# Patient Record
Sex: MALE | Race: WHITE | Employment: PART TIME | URBAN - METROPOLITAN AREA
[De-identification: names, ages, dates, MRNs, and addresses within clinical notes are randomized per-mention and may not be internally consistent; named-entity substitution may affect disease eponyms.]

---

## 2019-12-17 ENCOUNTER — ANESTHESIA EVENT (OUTPATIENT)
Dept: SURGERY | Age: 59
End: 2019-12-17
Payer: COMMERCIAL

## 2019-12-18 ENCOUNTER — ANESTHESIA (OUTPATIENT)
Dept: SURGERY | Age: 59
End: 2019-12-18
Payer: COMMERCIAL

## 2019-12-18 ENCOUNTER — APPOINTMENT (OUTPATIENT)
Dept: GENERAL RADIOLOGY | Age: 59
End: 2019-12-18
Attending: ORTHOPAEDIC SURGERY
Payer: COMMERCIAL

## 2019-12-18 ENCOUNTER — HOSPITAL ENCOUNTER (OUTPATIENT)
Age: 59
Setting detail: OUTPATIENT SURGERY
Discharge: HOME | End: 2019-12-18
Attending: ORTHOPAEDIC SURGERY | Admitting: ORTHOPAEDIC SURGERY
Payer: COMMERCIAL

## 2019-12-18 VITALS
HEART RATE: 78 BPM | TEMPERATURE: 97.4 F | SYSTOLIC BLOOD PRESSURE: 168 MMHG | HEIGHT: 76 IN | RESPIRATION RATE: 15 BRPM | DIASTOLIC BLOOD PRESSURE: 91 MMHG | OXYGEN SATURATION: 96 % | BODY MASS INDEX: 29.22 KG/M2 | WEIGHT: 240 LBS

## 2019-12-18 LAB
GLUCOSE BLD STRIP.AUTO-MCNC: 111 MG/DL (ref 65–100)
POTASSIUM BLD-SCNC: 3.5 MMOL/L (ref 3.5–5.1)

## 2019-12-18 PROCEDURE — 76942 ECHO GUIDE FOR BIOPSY: CPT | Performed by: ORTHOPAEDIC SURGERY

## 2019-12-18 PROCEDURE — 76060000033 HC ANESTHESIA 1 TO 1.5 HR: Performed by: ORTHOPAEDIC SURGERY

## 2019-12-18 PROCEDURE — 76210000020 HC REC RM PH II FIRST 0.5 HR: Performed by: ORTHOPAEDIC SURGERY

## 2019-12-18 PROCEDURE — C1713 ANCHOR/SCREW BN/BN,TIS/BN: HCPCS | Performed by: ORTHOPAEDIC SURGERY

## 2019-12-18 PROCEDURE — 77030000032 HC CUF TRNQT ZIMM -B: Performed by: ORTHOPAEDIC SURGERY

## 2019-12-18 PROCEDURE — 82962 GLUCOSE BLOOD TEST: CPT

## 2019-12-18 PROCEDURE — 77030003602 HC NDL NRV BLK BBMI -B: Performed by: ANESTHESIOLOGY

## 2019-12-18 PROCEDURE — 76210000063 HC OR PH I REC FIRST 0.5 HR: Performed by: ORTHOPAEDIC SURGERY

## 2019-12-18 PROCEDURE — 84132 ASSAY OF SERUM POTASSIUM: CPT

## 2019-12-18 PROCEDURE — 74011000250 HC RX REV CODE- 250: Performed by: NURSE ANESTHETIST, CERTIFIED REGISTERED

## 2019-12-18 PROCEDURE — 74011250636 HC RX REV CODE- 250/636: Performed by: NURSE ANESTHETIST, CERTIFIED REGISTERED

## 2019-12-18 PROCEDURE — 74011250636 HC RX REV CODE- 250/636: Performed by: ANESTHESIOLOGY

## 2019-12-18 PROCEDURE — 77030008848 HC WRE K SYNT -B: Performed by: ORTHOPAEDIC SURGERY

## 2019-12-18 PROCEDURE — 76010010054 HC POST OP PAIN BLOCK: Performed by: ORTHOPAEDIC SURGERY

## 2019-12-18 PROCEDURE — 77030006788 HC BLD SAW OSC STRY -B: Performed by: ORTHOPAEDIC SURGERY

## 2019-12-18 PROCEDURE — 74011250636 HC RX REV CODE- 250/636: Performed by: ORTHOPAEDIC SURGERY

## 2019-12-18 PROCEDURE — 76010000161 HC OR TIME 1 TO 1.5 HR INTENSV-TIER 1: Performed by: ORTHOPAEDIC SURGERY

## 2019-12-18 PROCEDURE — 77030002933 HC SUT MCRYL J&J -A: Performed by: ORTHOPAEDIC SURGERY

## 2019-12-18 PROCEDURE — 77030002916 HC SUT ETHLN J&J -A: Performed by: ORTHOPAEDIC SURGERY

## 2019-12-18 PROCEDURE — 77030018836 HC SOL IRR NACL ICUM -A: Performed by: ORTHOPAEDIC SURGERY

## 2019-12-18 PROCEDURE — 77030020274 HC MISC IMPL ORTHOPEDIC: Performed by: ORTHOPAEDIC SURGERY

## 2019-12-18 PROCEDURE — 77030025281 HC SPLNT ORTHGLS 1 BSNM -B: Performed by: ORTHOPAEDIC SURGERY

## 2019-12-18 PROCEDURE — 77030022715 HC BIT DRL WRGH -D: Performed by: ORTHOPAEDIC SURGERY

## 2019-12-18 DEVICE — IMPLANTABLE DEVICE: Type: IMPLANTABLE DEVICE | Site: ANKLE | Status: FUNCTIONAL

## 2019-12-18 DEVICE — K WIRE FIX L150MM DIA1MM S STL TRCR PNT BOTH END: Type: IMPLANTABLE DEVICE | Site: ANKLE | Status: FUNCTIONAL

## 2019-12-18 RX ORDER — MIDAZOLAM HYDROCHLORIDE 1 MG/ML
2 INJECTION, SOLUTION INTRAMUSCULAR; INTRAVENOUS
Status: DISCONTINUED | OUTPATIENT
Start: 2019-12-18 | End: 2019-12-18 | Stop reason: HOSPADM

## 2019-12-18 RX ORDER — SODIUM CHLORIDE, SODIUM LACTATE, POTASSIUM CHLORIDE, CALCIUM CHLORIDE 600; 310; 30; 20 MG/100ML; MG/100ML; MG/100ML; MG/100ML
50 INJECTION, SOLUTION INTRAVENOUS CONTINUOUS
Status: DISCONTINUED | OUTPATIENT
Start: 2019-12-18 | End: 2019-12-18 | Stop reason: HOSPADM

## 2019-12-18 RX ORDER — ALBUTEROL SULFATE 0.83 MG/ML
2.5 SOLUTION RESPIRATORY (INHALATION) AS NEEDED
Status: DISCONTINUED | OUTPATIENT
Start: 2019-12-18 | End: 2019-12-18 | Stop reason: HOSPADM

## 2019-12-18 RX ORDER — LIDOCAINE HYDROCHLORIDE 20 MG/ML
INJECTION, SOLUTION EPIDURAL; INFILTRATION; INTRACAUDAL; PERINEURAL AS NEEDED
Status: DISCONTINUED | OUTPATIENT
Start: 2019-12-18 | End: 2019-12-18 | Stop reason: HOSPADM

## 2019-12-18 RX ORDER — SODIUM CHLORIDE 0.9 % (FLUSH) 0.9 %
5-40 SYRINGE (ML) INJECTION EVERY 8 HOURS
Status: DISCONTINUED | OUTPATIENT
Start: 2019-12-18 | End: 2019-12-18 | Stop reason: HOSPADM

## 2019-12-18 RX ORDER — MIDAZOLAM HYDROCHLORIDE 1 MG/ML
2 INJECTION, SOLUTION INTRAMUSCULAR; INTRAVENOUS ONCE
Status: COMPLETED | OUTPATIENT
Start: 2019-12-18 | End: 2019-12-18

## 2019-12-18 RX ORDER — PROPOFOL 10 MG/ML
INJECTION, EMULSION INTRAVENOUS AS NEEDED
Status: DISCONTINUED | OUTPATIENT
Start: 2019-12-18 | End: 2019-12-18 | Stop reason: HOSPADM

## 2019-12-18 RX ORDER — SODIUM CHLORIDE, SODIUM LACTATE, POTASSIUM CHLORIDE, CALCIUM CHLORIDE 600; 310; 30; 20 MG/100ML; MG/100ML; MG/100ML; MG/100ML
75 INJECTION, SOLUTION INTRAVENOUS CONTINUOUS
Status: DISCONTINUED | OUTPATIENT
Start: 2019-12-18 | End: 2019-12-18 | Stop reason: HOSPADM

## 2019-12-18 RX ORDER — FENTANYL CITRATE 50 UG/ML
100 INJECTION, SOLUTION INTRAMUSCULAR; INTRAVENOUS ONCE
Status: COMPLETED | OUTPATIENT
Start: 2019-12-18 | End: 2019-12-18

## 2019-12-18 RX ORDER — CELECOXIB 200 MG/1
200 CAPSULE ORAL
Status: DISCONTINUED | OUTPATIENT
Start: 2019-12-18 | End: 2019-12-18 | Stop reason: HOSPADM

## 2019-12-18 RX ORDER — LIDOCAINE HYDROCHLORIDE 10 MG/ML
0.1 INJECTION INFILTRATION; PERINEURAL AS NEEDED
Status: DISCONTINUED | OUTPATIENT
Start: 2019-12-18 | End: 2019-12-18 | Stop reason: HOSPADM

## 2019-12-18 RX ORDER — CEFAZOLIN SODIUM/WATER 2 G/20 ML
2 SYRINGE (ML) INTRAVENOUS ONCE
Status: COMPLETED | OUTPATIENT
Start: 2019-12-18 | End: 2019-12-18

## 2019-12-18 RX ORDER — SODIUM CHLORIDE 0.9 % (FLUSH) 0.9 %
5-40 SYRINGE (ML) INJECTION AS NEEDED
Status: DISCONTINUED | OUTPATIENT
Start: 2019-12-18 | End: 2019-12-18 | Stop reason: HOSPADM

## 2019-12-18 RX ORDER — HYDROMORPHONE HYDROCHLORIDE 2 MG/ML
0.5 INJECTION, SOLUTION INTRAMUSCULAR; INTRAVENOUS; SUBCUTANEOUS
Status: DISCONTINUED | OUTPATIENT
Start: 2019-12-18 | End: 2019-12-18 | Stop reason: HOSPADM

## 2019-12-18 RX ORDER — PROPOFOL 10 MG/ML
INJECTION, EMULSION INTRAVENOUS
Status: DISCONTINUED | OUTPATIENT
Start: 2019-12-18 | End: 2019-12-18 | Stop reason: HOSPADM

## 2019-12-18 RX ORDER — OXYCODONE HYDROCHLORIDE 5 MG/1
5 TABLET ORAL
Status: DISCONTINUED | OUTPATIENT
Start: 2019-12-18 | End: 2019-12-18 | Stop reason: HOSPADM

## 2019-12-18 RX ADMIN — MEPIVACAINE HYDROCHLORIDE 5 ML: 20 INJECTION, SOLUTION EPIDURAL; INFILTRATION at 07:15

## 2019-12-18 RX ADMIN — ROPIVACAINE HYDROCHLORIDE 10 ML: 5 INJECTION, SOLUTION EPIDURAL; INFILTRATION; PERINEURAL at 07:15

## 2019-12-18 RX ADMIN — PROPOFOL 50 MG: 10 INJECTION, EMULSION INTRAVENOUS at 07:51

## 2019-12-18 RX ADMIN — MIDAZOLAM 2 MG: 1 INJECTION INTRAMUSCULAR; INTRAVENOUS at 07:10

## 2019-12-18 RX ADMIN — SODIUM CHLORIDE, SODIUM LACTATE, POTASSIUM CHLORIDE, AND CALCIUM CHLORIDE 75 ML/HR: 600; 310; 30; 20 INJECTION, SOLUTION INTRAVENOUS at 07:10

## 2019-12-18 RX ADMIN — FENTANYL CITRATE 50 MCG: 50 INJECTION, SOLUTION INTRAMUSCULAR; INTRAVENOUS at 07:10

## 2019-12-18 RX ADMIN — PROPOFOL 75 MCG/KG/MIN: 10 INJECTION, EMULSION INTRAVENOUS at 07:53

## 2019-12-18 RX ADMIN — MEPIVACAINE HYDROCHLORIDE 5 ML: 20 INJECTION, SOLUTION EPIDURAL; INFILTRATION at 07:13

## 2019-12-18 RX ADMIN — Medication 2 G: at 07:51

## 2019-12-18 RX ADMIN — LIDOCAINE HYDROCHLORIDE 50 MG: 20 INJECTION, SOLUTION EPIDURAL; INFILTRATION; INTRACAUDAL; PERINEURAL at 07:51

## 2019-12-18 RX ADMIN — ROPIVACAINE HYDROCHLORIDE 25 ML: 5 INJECTION, SOLUTION EPIDURAL; INFILTRATION; PERINEURAL at 07:13

## 2019-12-18 NOTE — ANESTHESIA PREPROCEDURE EVALUATION
Relevant Problems   No relevant active problems       Anesthetic History   No history of anesthetic complications            Review of Systems / Medical History  Patient summary reviewed, nursing notes reviewed and pertinent labs reviewed    Pulmonary  Within defined limits                 Neuro/Psych   Within defined limits           Cardiovascular    Hypertension: well controlled              Exercise tolerance: >4 METS  Comments: TTE 2019- Normal EF no significant valve abnl   GI/Hepatic/Renal     GERD: well controlled           Endo/Other    Diabetes: type 2    Arthritis (rheumatoid)     Other Findings            Physical Exam    Airway  Mallampati: II    Neck ROM: normal range of motion   Mouth opening: Normal     Cardiovascular  Regular rate and rhythm,  S1 and S2 normal,  no murmur, click, rub, or gallop             Dental  No notable dental hx       Pulmonary  Breath sounds clear to auscultation               Abdominal         Other Findings            Anesthetic Plan    ASA: 2  Anesthesia type: total IV anesthesia - femoral single shot and popliteal fossa block      Post-op pain plan if not by surgeon: peripheral nerve block single    Induction: Intravenous  Anesthetic plan and risks discussed with: Patient and Spouse

## 2019-12-18 NOTE — ANESTHESIA POSTPROCEDURE EVALUATION
Procedure(s):  RIGHT MIDFOOT ACHILLES LENGTHENING / CALCANEAL OSTEOTOMY / SECOUND PROXIMAL INTERPHALANGEAL RESECTION ARTHROPLASTY. total IV anesthesia    Anesthesia Post Evaluation      Multimodal analgesia: multimodal analgesia used between 6 hours prior to anesthesia start to PACU discharge  Patient location during evaluation: PACU  Patient participation: complete - patient participated  Level of consciousness: awake and alert  Pain score: 0  Pain management: adequate  Airway patency: patent  Anesthetic complications: no  Cardiovascular status: acceptable  Respiratory status: acceptable, spontaneous ventilation and nonlabored ventilation  Hydration status: acceptable  Post anesthesia nausea and vomiting:  none      Vitals Value Taken Time   /100 12/18/2019  9:22 AM   Temp 36.3 °C (97.4 °F) 12/18/2019  9:16 AM   Pulse 67 12/18/2019  9:25 AM   Resp 15 12/18/2019  9:22 AM   SpO2 96 % 12/18/2019  9:25 AM   Vitals shown include unvalidated device data.

## 2019-12-18 NOTE — PERIOP NOTES
PACU DISCHARGE NOTE  Vital signs stable, pain well controlled, alert and oriented times three or at baseline, no anesthetic complications. IV removed with catheter tip intact. Written and verbal discharge instructions given, including pain control, dressing care and follow up appointment. Spouse, Sandro Blair verbalized understanding and signed discharge instructions electronically. All questions answered prior to discharge. Dr Shaw Book okay to discharge at this time. Pt and all belongings taken via wheelchair and safely put in vehicle.

## 2019-12-18 NOTE — DISCHARGE INSTRUCTIONS
INSTRUCTIONS FOLLOWING FOOT SURGERY    ACTIVITY  Elevate foot. No Ice    No weight bearing. Use crutches or knee walker until seen in follow up appointment  Don't put anything into the splint to relieve itching etc. Take one 325mg aspirin daily if okay with your medical doctor and you have no GI ulcer. Get up and out of bed frequently. While in bed move the legs as much as possible)      DRESSING CARE Keep dry and in place until follow up appointment      DIET  Day of Surgery: Clear liquids until no nausea or vomiting; then light, bland diet (Baked chicken, plain rice, grits, scrambled egg, toast). Nothing Greasy, fried or spicy today  Advance to regular diet on second day, unless your doctor orders otherwise. PAIN  Take pain medications as directed by your doctor. Call your doctor if pain is NOT relieved by medication. PAIN MED SIDE EFFECTS  Constipation: Lots of fluids, try prune juice, then OTC stool softeners if necessary  Nausea: Take medication with food. CALL YOUR DOCTOR IF YOU HAVE  Excessive bleeding that does not stop after holding mild pressure over the area. Temperature of 101 degrees or above. Redness, excessive swelling or bruising, and/or green or yellow, smelly discharge from incision. Loss of sensation - cold, white or blue toes. AFTER ANESTHESIA  For the first 24 hours and while taking narcotics for pain: DO NOT Drive, Drink Alcoholic beverages, or make important Decisions. Be aware of dizziness following anesthesia and while taking pain medication. Preventing Infection at Home  We care about preventing infection and avoiding the spread of germs - not only when you are in the hospital but also when you return home. When you return home from the hospital, its important to take the following steps to help prevent infection and avoid spreading germs that could infect you and others. Ask everyone in your home to follow these guidelines, too.     Clean Your Hands  · Clean your hands whenever your hands are visibly dirty, before you eat, before or after touching your mouth, nose or eyes, and before preparing food. Clean them after contact with body fluids, using the restroom, touching animals or changing diapers. · When washing hands, wet them with warm water and work up a lather. Rub hands for at least 15 seconds, then rinse them and pat them dry with a clean towel or paper towel. · When using hand sanitizers, it should take about 15 seconds to rub your hands dry. If not, you probably didnt apply enough . Cover Your Sneeze or Cough  Germs are released into the air whenever you sneeze or cough. To prevent the spread of infection:  · Turn away from other people before coughing or sneezing. · Cover your mouth or nose with a tissue when you cough or sneeze. Put the tissue in the trash. · If you dont have a tissue, cough or sneeze into your upper sleeve, not your hands. · Always clean your hands after coughing or sneezing. Care for Wounds  Your skin is your bodys first line of defense against germs, but an open wound leaves an easy way for germs to enter your body. To prevent infection:  · Clean your hands before and after changing wound dressings, and wear gloves to change dressings if recommended by your doctor. · Take special care with IV lines or other devices inserted into the body. If you must touch them, clean your hands first.  · Follow any specific instructions from your doctor to care for your wounds. Contact your doctor if you experience any signs of infection, such as fever or increased redness at the surgical or wound site. Keep a Clean Home  · Clean or wipe commonly touched hard surfaces like door handles, sinks, tabletops, phones and TV remotes. · Use products labeled disinfectant to kill harmful bacteria and viruses. · Use a clean cloth or paper towel to clean and dry surfaces.  Wiping surfaces with a dirty dishcloth, sponge or towel will only spread germs. · Never share toothbrushes, mccray, drinking glasses, utensils, razor blades, face cloths or bath towels to avoid spreading germs. · Be sure that the linens that you sleep on are clean. · Keep pets away from wounds and wash your hands after touching pets, their toys or bedding. We care about you and your health. Remember, preventing infections is a team effort between you, your family, friends and health care providers. DISCHARGE SUMMARY from Nurse    PATIENT INSTRUCTIONS:    After general anesthesia or intravenous sedation, for 24 hours or while taking prescription Narcotics:  · Limit your activities  · Do not drive and operate hazardous machinery  · Do not make important personal or business decisions  · Do  not drink alcoholic beverages  · If you have not urinated within 8 hours after discharge, please contact your surgeon on call. *  Please give a list of your current medications to your Primary Care Provider. *  Please update this list whenever your medications are discontinued, doses are      changed, or new medications (including over-the-counter products) are added. *  Please carry medication information at all times in case of emergency situations. These are general instructions for a healthy lifestyle:    No smoking/ No tobacco products/ Avoid exposure to second hand smoke    Surgeon General's Warning:  Quitting smoking now greatly reduces serious risk to your health. Obesity, smoking, and sedentary lifestyle greatly increases your risk for illness    A healthy diet, regular physical exercise & weight monitoring are important for maintaining a healthy lifestyle    You may be retaining fluid if you have a history of heart failure or if you experience any of the following symptoms:  Weight gain of 3 pounds or more overnight or 5 pounds in a week, increased swelling in our hands or feet or shortness of breath while lying flat in bed.   Please call your doctor as soon as you notice any of these symptoms; do not wait until your next office visit. Recognize signs and symptoms of STROKE:    F-face looks uneven    A-arms unable to move or move unevenly    S-speech slurred or non-existent    T-time-call 911 as soon as signs and symptoms begin-DO NOT go       Back to bed or wait to see if you get better-TIME IS BRAIN.

## 2019-12-18 NOTE — ANESTHESIA PROCEDURE NOTES
Peripheral Block    Start time: 12/18/2019 7:13 AM  End time: 12/18/2019 7:15 AM  Performed by: Parmjit Link MD  Authorized by: Parmjit Link MD       Pre-procedure: Indications: at surgeon's request and post-op pain management    Preanesthetic Checklist: patient identified, risks and benefits discussed, site marked, timeout performed, anesthesia consent given and patient being monitored    Timeout Time: 07:13          Block Type:   Block Type:   Adductor canal  Laterality:  Right  Monitoring:  Responsive to questions, continuous pulse ox, oxygen, frequent vital sign checks and heart rate  Injection Technique:  Single shot  Procedures: ultrasound guided    Patient Position: supine  Prep: chlorhexidine    Location:  Upper thigh  Needle Type:  Stimuplex  Needle Gauge:  21 G  Needle Localization:  Ultrasound guidance    Assessment:  Number of attempts:  1  Injection Assessment:  Incremental injection every 5 mL, negative aspiration for CSF, no paresthesia, ultrasound image on chart, no intravascular symptoms, negative aspiration for blood and local visualized surrounding nerve on ultrasound  Patient tolerance:  Patient tolerated the procedure well with no immediate complications

## 2019-12-18 NOTE — ANESTHESIA PROCEDURE NOTES
Peripheral Block    Start time: 12/18/2019 7:10 AM  End time: 12/18/2019 7:13 AM  Performed by: Jose De Jesus Contreras MD  Authorized by: Jose De Jesus Contreras MD       Pre-procedure:    Indications: at surgeon's request and post-op pain management    Preanesthetic Checklist: patient identified, risks and benefits discussed, site marked, timeout performed, anesthesia consent given and patient being monitored    Timeout Time: 07:10          Block Type:   Block Type:  Popliteal  Laterality:  Right  Monitoring:  Responsive to questions, continuous pulse ox, oxygen, frequent vital sign checks and heart rate  Injection Technique:  Single shot  Procedures: ultrasound guided and nerve stimulator    Prep: chlorhexidine    Location:  Lower thigh  Needle Type:  Stimuplex  Needle Gauge:  21 G  Needle Localization:  Ultrasound guidance and nerve stimulator  Motor Response: minimal motor response >0.4 mA      Assessment:  Number of attempts:  1  Injection Assessment:  Incremental injection every 5 mL, negative aspiration for CSF, no paresthesia, ultrasound image on chart, no intravascular symptoms, negative aspiration for blood and local visualized surrounding nerve on ultrasound  Patient tolerance:  Patient tolerated the procedure well with no immediate complications

## 2019-12-19 NOTE — OP NOTES
[facility]  OPERATIVE REPORT    Name:  Karie Warner  MR#:  [id number]  :  1960  ACCOUNT #:  [de-identified]  DATE OF SERVICE:  2019    PREOPERATIVE DIAGNOSES:  1. 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 2.  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 POSTOPERATIVE DIAGNOSES:  1. 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 2.  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 PROCEDURES PERFORMED:  1. Right triple hemisection Achilles lengthening, 34505.  2.  Right medial displacement calcaneal osteotomy, 99235.  3.  Right midfoot arthrodesis with plantar flexing osteotomy and multiple joint arthrodesis, 74160.  4.  Right second DIP resection arthroplasty, 06283. SURGEON:  Ginger Tamez MD        ANESTHESIA:  Popliteal block with monitored anesthesia. ESTIMATED BLOOD LOSS:  Minimal.    TOURNIQUET TIME:  63 minutes at 250 mmHg. ANTIBIOTIC PROPHYLAXIS:  Ancef given prior to the procedure. .    INDICATION:  The patient is a 55-year-old white male with a significant planovalgus foot deformity and collapse of the arch consistent with probable Charcot arthropathy with a history of significant neuropathy as well as an Achilles contracture and a malunion of the second DIP joint who presents today for operative treatment. The risks and benefits of the procedure including, but not limited, to the risks of anesthetic complications, myocardial infarction, stroke, death, and surgical complications including damage to blood vessels, risks of infection, incomplete pain relief, risk of malunion, risk of nonunion, risk for recurrence, need for lifetime bracing and need for additional surgery were discussed with the patient. He understands the risks and wishes to proceed with surgery at this time.     DETAILS OF THE PROCEDURE:  The patient's operative site was marked with indelible ink in the preoperative holding area. A block was placed by the Department of Anesthesia. He was brought to the operating room, placed supine, and prepped with surgical timeout. The right lower extremity was identified as surgical site, prepped and draped in the standard sterile fashion with ChloraPrep solution. A triple hemisection Achilles lengthening was then performed through three small stab incisions in the Achilles with good correction of the patient's equinus deformity. Lateral approach to the heel was then performed at time. A calcaneal osteotomy was performed with the tuberosity fragment shifted approximately 1 cm medially and secured using a [facility] titanium headless screw under fluoroscopic guidance confirming to be adequately placed. It was irrigated and closed using Monocryl and nylon sutures. A medial approach to the midfoot was then performed at time. The anterior tibial tendon was then transected and later repaired to prepare the first tarsometatarsal joint. A sagittal saw was used to create a plantar flexion closing osteotomy of the joint. The navicular cuneiform joint was also prepared using a curette followed by osteotome. The arch was then brought to desired clinical alignment and a separate arthrotomy was made of the great toe where a guide pin was then placed for the [facility] bolt. After confirming adequate placement, the bolt was placed over the guidewire with good purchase noted and confirmed to be adequately placed on image intensification. The wound was then irrigated and closed using Monocryl and nylon sutures.   DIP resection arthroplasty of the second toe was then performed through an elliptical incision and the arthrodesis site was then freshened using a saw and a [employer] 2.4 mm headless screw was then placed across the DIP joint and confirmed to be adequately placed on image intensification. The wounds were irrigated and closed using Monocryl and nylon sutures. Sterile dressings then applied followed by a well-padded posterior splint. Anesthesia was discontinued. The patient was transferred to recovery bed and taken to recovery room in satisfactory condition. He appeared to tolerate the procedure well. There were no apparent general anesthetic complications. All needle, instrument, and sponge counts were correct.       Nighat Neri MD      [id number]  D:  12/18/2019 12:26  T:  12/18/2019 23:52  JOB #:  [id number]

## 2020-07-16 ENCOUNTER — ANESTHESIA (OUTPATIENT)
Dept: SURGERY | Age: 60
End: 2020-07-16
Payer: COMMERCIAL

## 2020-07-16 ENCOUNTER — APPOINTMENT (OUTPATIENT)
Dept: GENERAL RADIOLOGY | Age: 60
End: 2020-07-16
Attending: ORTHOPAEDIC SURGERY
Payer: COMMERCIAL

## 2020-07-16 ENCOUNTER — HOSPITAL ENCOUNTER (OUTPATIENT)
Age: 60
Setting detail: OUTPATIENT SURGERY
Discharge: HOME | End: 2020-07-16
Attending: ORTHOPAEDIC SURGERY | Admitting: ORTHOPAEDIC SURGERY
Payer: COMMERCIAL

## 2020-07-16 ENCOUNTER — ANESTHESIA EVENT (OUTPATIENT)
Dept: SURGERY | Age: 60
End: 2020-07-16
Payer: COMMERCIAL

## 2020-07-16 VITALS
OXYGEN SATURATION: 98 % | DIASTOLIC BLOOD PRESSURE: 67 MMHG | HEART RATE: 66 BPM | RESPIRATION RATE: 16 BRPM | BODY MASS INDEX: 30.8 KG/M2 | TEMPERATURE: 97.8 F | WEIGHT: 253 LBS | SYSTOLIC BLOOD PRESSURE: 136 MMHG

## 2020-07-16 LAB
GLUCOSE BLD STRIP.AUTO-MCNC: 101 MG/DL (ref 65–100)
POTASSIUM BLD-SCNC: 3.8 MMOL/L (ref 3.5–5.1)

## 2020-07-16 PROCEDURE — 77030018836 HC SOL IRR NACL ICUM -A: Performed by: ORTHOPAEDIC SURGERY

## 2020-07-16 PROCEDURE — 77030003044 HC WRE K WRGH -B: Performed by: ORTHOPAEDIC SURGERY

## 2020-07-16 PROCEDURE — 74011250636 HC RX REV CODE- 250/636: Performed by: ORTHOPAEDIC SURGERY

## 2020-07-16 PROCEDURE — 77030040572 HC DRVR HEX PROSTEP MICA WRGH -C: Performed by: ORTHOPAEDIC SURGERY

## 2020-07-16 PROCEDURE — 77030010509 HC AIRWY LMA MSK TELE -A: Performed by: ANESTHESIOLOGY

## 2020-07-16 PROCEDURE — 77030000032 HC CUF TRNQT ZIMM -B: Performed by: ORTHOPAEDIC SURGERY

## 2020-07-16 PROCEDURE — 77030002933 HC SUT MCRYL J&J -A: Performed by: ORTHOPAEDIC SURGERY

## 2020-07-16 PROCEDURE — 76010000160 HC OR TIME 0.5 TO 1 HR INTENSV-TIER 1: Performed by: ORTHOPAEDIC SURGERY

## 2020-07-16 PROCEDURE — 77030019908 HC STETH ESOPH SIMS -A: Performed by: ANESTHESIOLOGY

## 2020-07-16 PROCEDURE — 77030006788 HC BLD SAW OSC STRY -B: Performed by: ORTHOPAEDIC SURGERY

## 2020-07-16 PROCEDURE — 82962 GLUCOSE BLOOD TEST: CPT

## 2020-07-16 PROCEDURE — 77030002916 HC SUT ETHLN J&J -A: Performed by: ORTHOPAEDIC SURGERY

## 2020-07-16 PROCEDURE — 74011000250 HC RX REV CODE- 250: Performed by: STUDENT IN AN ORGANIZED HEALTH CARE EDUCATION/TRAINING PROGRAM

## 2020-07-16 PROCEDURE — 74011250636 HC RX REV CODE- 250/636: Performed by: STUDENT IN AN ORGANIZED HEALTH CARE EDUCATION/TRAINING PROGRAM

## 2020-07-16 PROCEDURE — 77030020274 HC MISC IMPL ORTHOPEDIC: Performed by: ORTHOPAEDIC SURGERY

## 2020-07-16 PROCEDURE — 76060000033 HC ANESTHESIA 1 TO 1.5 HR: Performed by: ORTHOPAEDIC SURGERY

## 2020-07-16 PROCEDURE — C1713 ANCHOR/SCREW BN/BN,TIS/BN: HCPCS | Performed by: ORTHOPAEDIC SURGERY

## 2020-07-16 PROCEDURE — 74011000250 HC RX REV CODE- 250: Performed by: ORTHOPAEDIC SURGERY

## 2020-07-16 PROCEDURE — 77030040571: Performed by: ORTHOPAEDIC SURGERY

## 2020-07-16 PROCEDURE — 76210000021 HC REC RM PH II 0.5 TO 1 HR: Performed by: ORTHOPAEDIC SURGERY

## 2020-07-16 PROCEDURE — 77030020275 HC MISC ORTHOPEDIC: Performed by: ORTHOPAEDIC SURGERY

## 2020-07-16 PROCEDURE — 76210000063 HC OR PH I REC FIRST 0.5 HR: Performed by: ORTHOPAEDIC SURGERY

## 2020-07-16 PROCEDURE — 84132 ASSAY OF SERUM POTASSIUM: CPT

## 2020-07-16 PROCEDURE — 74011250636 HC RX REV CODE- 250/636: Performed by: ANESTHESIOLOGY

## 2020-07-16 PROCEDURE — 77030040573 HC BUR PROSTEP MICA WRGH -C: Performed by: ORTHOPAEDIC SURGERY

## 2020-07-16 PROCEDURE — 77030020778 HC CAP PROTCT PIN JRGN -A: Performed by: ORTHOPAEDIC SURGERY

## 2020-07-16 DEVICE — SCREW
Type: IMPLANTABLE DEVICE | Site: FOOT | Status: FUNCTIONAL
Brand: HV SCREW SYSTEM

## 2020-07-16 RX ORDER — LIDOCAINE HYDROCHLORIDE 20 MG/ML
INJECTION, SOLUTION EPIDURAL; INFILTRATION; INTRACAUDAL; PERINEURAL AS NEEDED
Status: DISCONTINUED | OUTPATIENT
Start: 2020-07-16 | End: 2020-07-16 | Stop reason: HOSPADM

## 2020-07-16 RX ORDER — HYDROMORPHONE HYDROCHLORIDE 2 MG/ML
0.5 INJECTION, SOLUTION INTRAMUSCULAR; INTRAVENOUS; SUBCUTANEOUS
Status: DISCONTINUED | OUTPATIENT
Start: 2020-07-16 | End: 2020-07-16 | Stop reason: HOSPADM

## 2020-07-16 RX ORDER — BUPIVACAINE HYDROCHLORIDE 5 MG/ML
INJECTION, SOLUTION EPIDURAL; INTRACAUDAL AS NEEDED
Status: DISCONTINUED | OUTPATIENT
Start: 2020-07-16 | End: 2020-07-16 | Stop reason: HOSPADM

## 2020-07-16 RX ORDER — SODIUM CHLORIDE, SODIUM LACTATE, POTASSIUM CHLORIDE, CALCIUM CHLORIDE 600; 310; 30; 20 MG/100ML; MG/100ML; MG/100ML; MG/100ML
75 INJECTION, SOLUTION INTRAVENOUS CONTINUOUS
Status: DISCONTINUED | OUTPATIENT
Start: 2020-07-16 | End: 2020-07-16 | Stop reason: HOSPADM

## 2020-07-16 RX ORDER — SODIUM CHLORIDE 0.9 % (FLUSH) 0.9 %
5-40 SYRINGE (ML) INJECTION AS NEEDED
Status: DISCONTINUED | OUTPATIENT
Start: 2020-07-16 | End: 2020-07-16 | Stop reason: HOSPADM

## 2020-07-16 RX ORDER — MIDAZOLAM HYDROCHLORIDE 1 MG/ML
2 INJECTION, SOLUTION INTRAMUSCULAR; INTRAVENOUS
Status: COMPLETED | OUTPATIENT
Start: 2020-07-16 | End: 2020-07-16

## 2020-07-16 RX ORDER — ONDANSETRON 2 MG/ML
INJECTION INTRAMUSCULAR; INTRAVENOUS AS NEEDED
Status: DISCONTINUED | OUTPATIENT
Start: 2020-07-16 | End: 2020-07-16 | Stop reason: HOSPADM

## 2020-07-16 RX ORDER — PROPOFOL 10 MG/ML
INJECTION, EMULSION INTRAVENOUS AS NEEDED
Status: DISCONTINUED | OUTPATIENT
Start: 2020-07-16 | End: 2020-07-16 | Stop reason: HOSPADM

## 2020-07-16 RX ORDER — OXYCODONE AND ACETAMINOPHEN 5; 325 MG/1; MG/1
1 TABLET ORAL AS NEEDED
Status: DISCONTINUED | OUTPATIENT
Start: 2020-07-16 | End: 2020-07-16 | Stop reason: HOSPADM

## 2020-07-16 RX ORDER — SODIUM CHLORIDE 0.9 % (FLUSH) 0.9 %
5-40 SYRINGE (ML) INJECTION EVERY 8 HOURS
Status: DISCONTINUED | OUTPATIENT
Start: 2020-07-16 | End: 2020-07-16 | Stop reason: HOSPADM

## 2020-07-16 RX ORDER — SODIUM CHLORIDE, SODIUM LACTATE, POTASSIUM CHLORIDE, CALCIUM CHLORIDE 600; 310; 30; 20 MG/100ML; MG/100ML; MG/100ML; MG/100ML
INJECTION, SOLUTION INTRAVENOUS
Status: DISCONTINUED | OUTPATIENT
Start: 2020-07-16 | End: 2020-07-16 | Stop reason: HOSPADM

## 2020-07-16 RX ORDER — LIDOCAINE HYDROCHLORIDE 10 MG/ML
0.1 INJECTION INFILTRATION; PERINEURAL AS NEEDED
Status: DISCONTINUED | OUTPATIENT
Start: 2020-07-16 | End: 2020-07-16 | Stop reason: HOSPADM

## 2020-07-16 RX ORDER — EPHEDRINE SULFATE/0.9% NACL/PF 50 MG/5 ML
SYRINGE (ML) INTRAVENOUS AS NEEDED
Status: DISCONTINUED | OUTPATIENT
Start: 2020-07-16 | End: 2020-07-16 | Stop reason: HOSPADM

## 2020-07-16 RX ORDER — NALOXONE HYDROCHLORIDE 0.4 MG/ML
0.2 INJECTION, SOLUTION INTRAMUSCULAR; INTRAVENOUS; SUBCUTANEOUS AS NEEDED
Status: DISCONTINUED | OUTPATIENT
Start: 2020-07-16 | End: 2020-07-16 | Stop reason: HOSPADM

## 2020-07-16 RX ORDER — CEFAZOLIN SODIUM/WATER 2 G/20 ML
2 SYRINGE (ML) INTRAVENOUS ONCE
Status: COMPLETED | OUTPATIENT
Start: 2020-07-16 | End: 2020-07-16

## 2020-07-16 RX ADMIN — Medication 2 G: at 08:53

## 2020-07-16 RX ADMIN — SODIUM CHLORIDE, SODIUM LACTATE, POTASSIUM CHLORIDE, AND CALCIUM CHLORIDE: 600; 310; 30; 20 INJECTION, SOLUTION INTRAVENOUS at 08:42

## 2020-07-16 RX ADMIN — SODIUM CHLORIDE, SODIUM LACTATE, POTASSIUM CHLORIDE, AND CALCIUM CHLORIDE 75 ML/HR: 600; 310; 30; 20 INJECTION, SOLUTION INTRAVENOUS at 08:00

## 2020-07-16 RX ADMIN — Medication 10 MG: at 09:20

## 2020-07-16 RX ADMIN — PROPOFOL 200 MG: 10 INJECTION, EMULSION INTRAVENOUS at 08:48

## 2020-07-16 RX ADMIN — PHENYLEPHRINE HYDROCHLORIDE 150 MCG: 10 INJECTION INTRAVENOUS at 09:06

## 2020-07-16 RX ADMIN — PHENYLEPHRINE HYDROCHLORIDE 100 MCG: 10 INJECTION INTRAVENOUS at 09:20

## 2020-07-16 RX ADMIN — Medication 10 MG: at 09:13

## 2020-07-16 RX ADMIN — LIDOCAINE HYDROCHLORIDE 80 MG: 20 INJECTION, SOLUTION EPIDURAL; INFILTRATION; INTRACAUDAL; PERINEURAL at 08:48

## 2020-07-16 RX ADMIN — PHENYLEPHRINE HYDROCHLORIDE 150 MCG: 10 INJECTION INTRAVENOUS at 09:01

## 2020-07-16 RX ADMIN — PHENYLEPHRINE HYDROCHLORIDE 100 MCG: 10 INJECTION INTRAVENOUS at 08:50

## 2020-07-16 RX ADMIN — SODIUM CHLORIDE, SODIUM LACTATE, POTASSIUM CHLORIDE, AND CALCIUM CHLORIDE: 600; 310; 30; 20 INJECTION, SOLUTION INTRAVENOUS at 09:32

## 2020-07-16 RX ADMIN — Medication 5 MG: at 09:09

## 2020-07-16 RX ADMIN — MIDAZOLAM 2 MG: 1 INJECTION INTRAMUSCULAR; INTRAVENOUS at 08:16

## 2020-07-16 RX ADMIN — ONDANSETRON 4 MG: 2 INJECTION INTRAMUSCULAR; INTRAVENOUS at 09:27

## 2020-07-16 NOTE — BRIEF OP NOTE
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 Procedure(s):  RIGHT MIS AKIN OSTEOTOMY  RIGHT REVISION SECOND PROXIMAL INTERPHALANGEAL RESECTION ARTHROPLASTY AND METATARSAOPHALANGEAL RELEASE/ CHOICE ANES    Surgeon(s):  Arthur Guzman MD    Surgical Assistant: None    Anesthesia: General     Estimated Blood Loss (mL): Minimal    Complications: None    Specimens: * No specimens in log *     Implants:   Implant Name Type Inv.  Item Serial No.  Lot No. LRB No. Used Action   [id number]  3.0 mm cannulated comp hdless screw x 40mm    [employer] [place] [id number] Right 1 Implanted   SCR BNE HALLUX VALGUS 3X26MM --  - [id number]  SCR BNE HALLUX VALGUS 3X26MM --   [employer] [id number] Implanted       Drains: * No LDAs found *    Findings:     Electronically Signed by Farooq Ford MD on 7/16/2020 at 1:57 PM

## 2020-07-16 NOTE — DISCHARGE INSTRUCTIONS
INSTRUCTIONS FOLLOWING FOOT SURGERY    ACTIVITY  Elevate foot   Protected partial weight bearing on the heel only as tolerated in post op shoe after full sensation returns. DIET  Clear liquids until no nausea or vomiting; then light diet for the first day. Advance to regular diet on second day, unless your doctor orders otherwise. PAIN  Take pain medications as directed by your doctor. Call your doctor if pain is NOT relieved by medication. DRESSING CARE Keep dry and in place until follow up appointment    CALL YOUR DOCTOR IF YOU HAVE  Excessive bleeding that does not stop after holding mild pressure over the area. Temperature of 101 degrees or above. Redness, excessive swelling or bruising, and/or green or yellow, smelly discharge from incision. Loss of sensation - cold, white or blue toes. AFTER ANESTHESIA  For the first 24 hours and while taking narcotics for pain: DO NOT Drive, Drink Alcoholic beverages, or make important Decisions. Be aware of dizziness following anesthesia and while taking pain medication. ·         APPOINTMENT DATE/TIME Keep as scheduled    YOUR DOCTOR'S PHONE NUMBER [phone]      DISCHARGE SUMMARY from Nurse    PATIENT INSTRUCTIONS:    After general anesthesia or intravenous sedation, for 24 hours or while taking prescription Narcotics:  · Limit your activities  · Do not drive and operate hazardous machinery  · Do not make important personal or business decisions  · Do  not drink alcoholic beverages  · If you have not urinated within 8 hours after discharge, please contact your surgeon on call. *  Please give a list of your current medications to your Primary Care Provider. *  Please update this list whenever your medications are discontinued, doses are      changed, or new medications (including over-the-counter products) are added. *  Please carry medication information at all times in case of emergency situations.       These are general instructions for a healthy lifestyle:    No smoking/ No tobacco products/ Avoid exposure to second hand smoke    Surgeon General's Warning:  Quitting smoking now greatly reduces serious risk to your health. Obesity, smoking, and sedentary lifestyle greatly increases your risk for illness    A healthy diet, regular physical exercise & weight monitoring are important for maintaining a healthy lifestyle    You may be retaining fluid if you have a history of heart failure or if you experience any of the following symptoms:  Weight gain of 3 pounds or more overnight or 5 pounds in a week, increased swelling in our hands or feet or shortness of breath while lying flat in bed. Please call your doctor as soon as you notice any of these symptoms; do not wait until your next office visit. Recognize signs and symptoms of STROKE:    F-face looks uneven    A-arms unable to move or move unevenly    S-speech slurred or non-existent    T-time-call 911 as soon as signs and symptoms begin-DO NOT go   Advance Care Planning  People with COVID-19 may have no symptoms, mild symptoms, such as fever, cough, and shortness of breath or they may have more severe illness, developing severe and fatal pneumonia. As a result, Advance Care Planning with attention to naming a health care decision maker (someone you trust to make healthcare decisions for you if you could not speak for yourself) and sharing other health care preferences is important BEFORE a possible health crisis. Please contact your Primary Care Provider to discuss Advance Care Planning.      Preventing the Spread of Coronavirus Disease 2019 in Homes and Residential Communities  For the most recent information go to [employer]    Prevention steps for People with confirmed or suspected COVID-19 (including persons under investigation) who do not need to be hospitalized  and   People with confirmed COVID-19 who were hospitalized and determined to be medically stable to go home    Your healthcare provider and public health staff will evaluate whether you can be cared for at home. If it is determined that you do not need to be hospitalized and can be isolated at home, you will be monitored by staff from your local or state health department. You should follow the prevention steps below until a healthcare provider or local or state health department says you can return to your normal activities. Stay home except to get medical care  People who are mildly ill with COVID-19 are able to isolate at home during their illness. You should restrict activities outside your home, except for getting medical care. Do not go to work, school, or public areas. Avoid using public transportation, ride-sharing, or taxis. Separate yourself from other people and animals in your home  People: As much as possible, you should stay in a specific room and away from other people in your home. Also, you should use a separate bathroom, if available. Animals: You should restrict contact with pets and other animals while you are sick with COVID-19, just like you would around other people. Although there have not been reports of pets or other animals becoming sick with COVID-19, it is still recommended that people sick with COVID-19 limit contact with animals until more information is known about the virus. When possible, have another member of your household care for your animals while you are sick. If you are sick with COVID-19, avoid contact with your pet, including petting, snuggling, being kissed or licked, and sharing food. If you must care for your pet or be around animals while you are sick, wash your hands before and after you interact with pets and wear a facemask. Call ahead before visiting your doctor  If you have a medical appointment, call the healthcare provider and tell them that you have or may have COVID-19.  This will help the healthcare providers office take steps to keep other people from getting infected or exposed. Wear a facemask  You should wear a facemask when you are around other people (e.g., sharing a room or vehicle) or pets and before you enter a healthcare providers office. If you are not able to wear a facemask (for example, because it causes trouble breathing), then people who live with you should not stay in the same room with you, or they should wear a facemask if they enter your room. Cover your coughs and sneezes  Cover your mouth and nose with a tissue when you cough or sneeze. Throw used tissues in a lined trash can. Immediately wash your hands with soap and water for at least 20 seconds or, if soap and water are not available, clean your hands with an alcohol-based hand  that contains at least 60% alcohol. Clean your hands often  Wash your hands often with soap and water for at least 20 seconds, especially after blowing your nose, coughing, or sneezing; going to the bathroom; and before eating or preparing food. If soap and water are not readily available, use an alcohol-based hand  with at least 60% alcohol, covering all surfaces of your hands and rubbing them together until they feel dry. Soap and water are the best option if hands are visibly dirty. Avoid touching your eyes, nose, and mouth with unwashed hands. Avoid sharing personal household items  You should not share dishes, drinking glasses, cups, eating utensils, towels, or bedding with other people or pets in your home. After using these items, they should be washed thoroughly with soap and water. Clean all high-touch surfaces everyday  High touch surfaces include counters, tabletops, doorknobs, bathroom fixtures, toilets, phones, keyboards, tablets, and bedside tables. Also, clean any surfaces that may have blood, stool, or body fluids on them. Use a household cleaning spray or wipe, according to the label instructions.  Labels contain instructions for safe and effective use of the cleaning product including precautions you should take when applying the product, such as wearing gloves and making sure you have good ventilation during use of the product. Monitor your symptoms  Seek prompt medical attention if your illness is worsening (e.g., difficulty breathing). Before seeking care, call your healthcare provider and tell them that you have, or are being evaluated for, COVID-19. Put on a facemask before you enter the facility. These steps will help the healthcare providers office to keep other people in the office or waiting room from getting infected or exposed. Ask your healthcare provider to call the local or state health department. Persons who are placed under active monitoring or facilitated self-monitoring should follow instructions provided by their local health department or occupational health professionals, as appropriate. When working with your local health department check their available hours. If you have a medical emergency and need to call 911, notify the dispatch personnel that you have, or are being evaluated for COVID-19. If possible, put on a facemask before emergency medical services arrive. Discontinuing home isolation  Patients with confirmed COVID-19 should remain under home isolation precautions until the risk of secondary transmission to others is thought to be low. The decision to discontinue home isolation precautions should be made on a case-by-case basis, in consultation with healthcare providers and state and local health departments. Back to bed or wait to see if you get better-TIME IS BRAIN.

## 2020-07-16 NOTE — ANESTHESIA POSTPROCEDURE EVALUATION
Procedure(s):  RIGHT MIS AKIN OSTEOTOMY  RIGHT REVISION SECOND PROXIMAL INTERPHALANGEAL RESECTION ARTHROPLASTY AND METATARSAOPHALANGEAL RELEASE/ CHOICE ANES. general    Anesthesia Post Evaluation      Multimodal analgesia: multimodal analgesia used between 6 hours prior to anesthesia start to PACU discharge  Patient location during evaluation: PACU  Patient participation: complete - patient participated  Level of consciousness: awake and alert  Pain management: adequate  Airway patency: patent  Anesthetic complications: no  Cardiovascular status: acceptable  Respiratory status: acceptable  Hydration status: acceptable  Post anesthesia nausea and vomiting:  none  Final Post Anesthesia Temperature Assessment:  Normothermia (36.0-37.5 degrees C)      INITIAL Post-op Vital signs:   Vitals Value Taken Time   /75 7/16/2020 10:11 AM   Temp 36.6 °C (97.8 °F) 7/16/2020  9:46 AM   Pulse 67 7/16/2020 10:12 AM   Resp 13 7/16/2020 10:12 AM   SpO2 94 % 7/16/2020 10:12 AM   Vitals shown include unvalidated device data.

## 2020-07-16 NOTE — OP NOTES
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 PROCEDURE: 1. Right José Miguel proximal phalangeal closing wedge osteotomy the great toe, 75254                           2.  Right second proximal interphalangeal resection arthroplasty, 16400                           3.  Right second MTP release with capsulotomy. 28330    SURGEON: Simon Nayak MD    HARDWARE:   Implant Name Type Inv. Item Serial No.  Lot No. LRB No. Used Action   [id number]  3.0 mm cannulated comp hdless screw x 40mm    [employer] [place] [id number] Right 1 Implanted   SCR BNE HALLUX VALGUS 3X26MM --  - [id number]  SCR BNE HALLUX VALGUS [id number] --   [facility] [employer] [facility] [employer] [id number] Right 1 Implanted     INDICATIONS: This patient is a 78-year-old male status post previous right midfoot fusion as well as revision second claw toe correction. He is done quite well with a midfoot fusion however at some point during his postoperative course apparently fell and hit the screw in his second toe which is created a malunion of the second claw toe correction. He also has significant hallux valgus as well. He presents today for correction of these 2 problems. Risks and benefits of the procedure including but not limited to risk of anesthetic complications as well as surgical complications including damage nerves blood vessels, risk of infection, risk incomplete pain relief, risk of malunion, risk of nonunion, risk of recurrence and need for additional surgery discussed with the patient. He understands risks and wishes to proceed with surgery at this time. PROCEDURE IN DETAIL: A time out was done to confirm the operating procedure, surgeon, patient and site.   Once confirmed by the team, procedure was started. The patient was anesthetized using 0.5% marcaine. During a preop surgical timeout the right lower extremity is identified to correct surgical site and prepped draped in standard sterile fashion using ChloraPrep solution. Patient's previous approach to the second proximal interphalangeal joint was then reopened. The underlying screw was then removed at that time. Approximately 50% of the middle phalanx was removed at that time. The toe was brought in desired correct position again and a [employer] guidewire was then used to confirmed adequate placement. He had the screws and placed over this with good purchase noted which afforded excellent correction of the deformity in the second toe. An José Miguel osteotomy was performed using a [employer] bur through percutaneous incision and secured using headless screw. This was also confirmed to be adequately placed on image edification. The wounds were irrigated a dorsal approach the second MTP joint for that time with capsulotomy extensor tendon lengthening. Wounds were then closed using Monocryl nylon sutures. Sterile dressings and applied. Anesthesia was discontinued. The patient was transferred back to recovery bed. He was taken recovery in satisfactory condition. Appeared to tolerate the procedure well. There were no apparent surgical or anesthetic complications. All needle and sponge counts correct. TOURNIQUET TIME: Approx 25 minutes. SPECIMENS: none    ESTIMATED BLOOD LOSS: min mL.

## 2020-07-16 NOTE — PROGRESS NOTES
's pre-procedure visit and prayer with patient as requested.     Carmela Thomas MDiv, BS  Board Certified

## 2020-07-16 NOTE — ANESTHESIA PREPROCEDURE EVALUATION
Relevant Problems   No relevant active problems       Anesthetic History   No history of anesthetic complications            Review of Systems / Medical History  Patient summary reviewed, nursing notes reviewed and pertinent labs reviewed    Pulmonary          Smoker (Distant h/o smoking (quit 1995))         Neuro/Psych   Within defined limits           Cardiovascular    Hypertension: well controlled              Exercise tolerance: >4 METS  Comments: TTE 2019- Normal EF no significant valve abnl   GI/Hepatic/Renal     GERD: well controlled           Endo/Other    Diabetes: type 2    Arthritis (rheumatoid)     Other Findings            Physical Exam    Airway  Mallampati: II    Neck ROM: normal range of motion   Mouth opening: Normal     Cardiovascular  Regular rate and rhythm,  S1 and S2 normal,  no murmur, click, rub, or gallop             Dental  No notable dental hx       Pulmonary  Breath sounds clear to auscultation               Abdominal         Other Findings            Anesthetic Plan    ASA: 2  Anesthesia type: general          Induction: Intravenous  Anesthetic plan and risks discussed with: Patient and Spouse

## 2020-12-30 ENCOUNTER — ANESTHESIA EVENT (OUTPATIENT)
Dept: SURGERY | Age: 60
End: 2020-12-30
Payer: COMMERCIAL

## 2020-12-31 ENCOUNTER — HOSPITAL ENCOUNTER (OUTPATIENT)
Age: 60
Setting detail: OUTPATIENT SURGERY
Discharge: HOME | End: 2020-12-31
Attending: ORTHOPAEDIC SURGERY | Admitting: ORTHOPAEDIC SURGERY
Payer: COMMERCIAL

## 2020-12-31 ENCOUNTER — ANESTHESIA (OUTPATIENT)
Dept: SURGERY | Age: 60
End: 2020-12-31
Payer: COMMERCIAL

## 2020-12-31 ENCOUNTER — APPOINTMENT (OUTPATIENT)
Dept: GENERAL RADIOLOGY | Age: 60
End: 2020-12-31
Attending: ORTHOPAEDIC SURGERY
Payer: COMMERCIAL

## 2020-12-31 VITALS
TEMPERATURE: 98 F | BODY MASS INDEX: 31.04 KG/M2 | SYSTOLIC BLOOD PRESSURE: 148 MMHG | RESPIRATION RATE: 18 BRPM | WEIGHT: 255 LBS | DIASTOLIC BLOOD PRESSURE: 81 MMHG | OXYGEN SATURATION: 97 % | HEART RATE: 70 BPM

## 2020-12-31 LAB
GLUCOSE BLD STRIP.AUTO-MCNC: 93 MG/DL (ref 65–100)
POTASSIUM BLD-SCNC: 3.8 MMOL/L (ref 3.5–5.1)

## 2020-12-31 PROCEDURE — 77030006788 HC BLD SAW OSC STRY -B: Performed by: ORTHOPAEDIC SURGERY

## 2020-12-31 PROCEDURE — 77030002916 HC SUT ETHLN J&J -A: Performed by: ORTHOPAEDIC SURGERY

## 2020-12-31 PROCEDURE — 2709999900 HC NON-CHARGEABLE SUPPLY: Performed by: ORTHOPAEDIC SURGERY

## 2020-12-31 PROCEDURE — 82962 GLUCOSE BLOOD TEST: CPT

## 2020-12-31 PROCEDURE — 84132 ASSAY OF SERUM POTASSIUM: CPT

## 2020-12-31 PROCEDURE — 77030002933 HC SUT MCRYL J&J -A: Performed by: ORTHOPAEDIC SURGERY

## 2020-12-31 PROCEDURE — 74011250636 HC RX REV CODE- 250/636: Performed by: ANESTHESIOLOGY

## 2020-12-31 PROCEDURE — 77030000032 HC CUF TRNQT ZIMM -B: Performed by: ORTHOPAEDIC SURGERY

## 2020-12-31 PROCEDURE — 76210000006 HC OR PH I REC 0.5 TO 1 HR: Performed by: ORTHOPAEDIC SURGERY

## 2020-12-31 PROCEDURE — 76942 ECHO GUIDE FOR BIOPSY: CPT | Performed by: ORTHOPAEDIC SURGERY

## 2020-12-31 PROCEDURE — 76010010054 HC POST OP PAIN BLOCK: Performed by: ORTHOPAEDIC SURGERY

## 2020-12-31 PROCEDURE — C1713 ANCHOR/SCREW BN/BN,TIS/BN: HCPCS | Performed by: ORTHOPAEDIC SURGERY

## 2020-12-31 PROCEDURE — 74011000250 HC RX REV CODE- 250: Performed by: NURSE ANESTHETIST, CERTIFIED REGISTERED

## 2020-12-31 PROCEDURE — 77030002982 HC SUT POLYSRB J&J -A: Performed by: ORTHOPAEDIC SURGERY

## 2020-12-31 PROCEDURE — 74011250636 HC RX REV CODE- 250/636: Performed by: NURSE ANESTHETIST, CERTIFIED REGISTERED

## 2020-12-31 PROCEDURE — 74011250636 HC RX REV CODE- 250/636: Performed by: ORTHOPAEDIC SURGERY

## 2020-12-31 PROCEDURE — 76210000020 HC REC RM PH II FIRST 0.5 HR: Performed by: ORTHOPAEDIC SURGERY

## 2020-12-31 PROCEDURE — C1769 GUIDE WIRE: HCPCS | Performed by: ORTHOPAEDIC SURGERY

## 2020-12-31 PROCEDURE — 76060000033 HC ANESTHESIA 1 TO 1.5 HR: Performed by: ORTHOPAEDIC SURGERY

## 2020-12-31 PROCEDURE — 76010000161 HC OR TIME 1 TO 1.5 HR INTENSV-TIER 1: Performed by: ORTHOPAEDIC SURGERY

## 2020-12-31 PROCEDURE — 77030003602 HC NDL NRV BLK BBMI -B: Performed by: ANESTHESIOLOGY

## 2020-12-31 PROCEDURE — 77030028525 HC BIT DRL KT SYNT -E: Performed by: ORTHOPAEDIC SURGERY

## 2020-12-31 PROCEDURE — 74011250637 HC RX REV CODE- 250/637: Performed by: ANESTHESIOLOGY

## 2020-12-31 DEVICE — IMPLANTABLE DEVICE: Type: IMPLANTABLE DEVICE | Site: FOOT | Status: FUNCTIONAL

## 2020-12-31 DEVICE — STAPLE INT W11XH10MM WIRE 1.5X1.5MM HND WRST NIT SPD CONT: Type: IMPLANTABLE DEVICE | Site: TOE | Status: FUNCTIONAL

## 2020-12-31 RX ORDER — LIDOCAINE HYDROCHLORIDE 10 MG/ML
0.1 INJECTION INFILTRATION; PERINEURAL AS NEEDED
Status: DISCONTINUED | OUTPATIENT
Start: 2020-12-31 | End: 2020-12-31 | Stop reason: HOSPADM

## 2020-12-31 RX ORDER — MIDAZOLAM HYDROCHLORIDE 1 MG/ML
2 INJECTION, SOLUTION INTRAMUSCULAR; INTRAVENOUS
Status: COMPLETED | OUTPATIENT
Start: 2020-12-31 | End: 2020-12-31

## 2020-12-31 RX ORDER — SODIUM CHLORIDE, SODIUM LACTATE, POTASSIUM CHLORIDE, CALCIUM CHLORIDE 600; 310; 30; 20 MG/100ML; MG/100ML; MG/100ML; MG/100ML
75 INJECTION, SOLUTION INTRAVENOUS CONTINUOUS
Status: DISCONTINUED | OUTPATIENT
Start: 2020-12-31 | End: 2020-12-31 | Stop reason: HOSPADM

## 2020-12-31 RX ORDER — FLUMAZENIL 0.1 MG/ML
0.2 INJECTION INTRAVENOUS
Status: DISCONTINUED | OUTPATIENT
Start: 2020-12-31 | End: 2020-12-31 | Stop reason: HOSPADM

## 2020-12-31 RX ORDER — SODIUM CHLORIDE 0.9 % (FLUSH) 0.9 %
5-40 SYRINGE (ML) INJECTION EVERY 8 HOURS
Status: DISCONTINUED | OUTPATIENT
Start: 2020-12-31 | End: 2020-12-31 | Stop reason: HOSPADM

## 2020-12-31 RX ORDER — CEFAZOLIN SODIUM/WATER 2 G/20 ML
2 SYRINGE (ML) INTRAVENOUS ONCE
Status: COMPLETED | OUTPATIENT
Start: 2020-12-31 | End: 2020-12-31

## 2020-12-31 RX ORDER — LIDOCAINE HYDROCHLORIDE 20 MG/ML
INJECTION, SOLUTION EPIDURAL; INFILTRATION; INTRACAUDAL; PERINEURAL AS NEEDED
Status: DISCONTINUED | OUTPATIENT
Start: 2020-12-31 | End: 2020-12-31 | Stop reason: HOSPADM

## 2020-12-31 RX ORDER — HYDROMORPHONE HYDROCHLORIDE 2 MG/ML
0.5 INJECTION, SOLUTION INTRAMUSCULAR; INTRAVENOUS; SUBCUTANEOUS
Status: DISCONTINUED | OUTPATIENT
Start: 2020-12-31 | End: 2020-12-31 | Stop reason: HOSPADM

## 2020-12-31 RX ORDER — PROPOFOL 10 MG/ML
INJECTION, EMULSION INTRAVENOUS AS NEEDED
Status: DISCONTINUED | OUTPATIENT
Start: 2020-12-31 | End: 2020-12-31 | Stop reason: HOSPADM

## 2020-12-31 RX ORDER — DEXAMETHASONE SODIUM PHOSPHATE 4 MG/ML
INJECTION, SOLUTION INTRA-ARTICULAR; INTRALESIONAL; INTRAMUSCULAR; INTRAVENOUS; SOFT TISSUE
Status: COMPLETED | OUTPATIENT
Start: 2020-12-31 | End: 2020-12-31

## 2020-12-31 RX ORDER — MIDAZOLAM HYDROCHLORIDE 1 MG/ML
2 INJECTION, SOLUTION INTRAMUSCULAR; INTRAVENOUS
Status: DISCONTINUED | OUTPATIENT
Start: 2020-12-31 | End: 2020-12-31 | Stop reason: HOSPADM

## 2020-12-31 RX ORDER — NALOXONE HYDROCHLORIDE 0.4 MG/ML
0.1 INJECTION, SOLUTION INTRAMUSCULAR; INTRAVENOUS; SUBCUTANEOUS
Status: DISCONTINUED | OUTPATIENT
Start: 2020-12-31 | End: 2020-12-31 | Stop reason: HOSPADM

## 2020-12-31 RX ORDER — PROPOFOL 10 MG/ML
INJECTION, EMULSION INTRAVENOUS
Status: DISCONTINUED | OUTPATIENT
Start: 2020-12-31 | End: 2020-12-31 | Stop reason: HOSPADM

## 2020-12-31 RX ORDER — OXYCODONE HYDROCHLORIDE 5 MG/1
5 TABLET ORAL
Status: DISCONTINUED | OUTPATIENT
Start: 2020-12-31 | End: 2020-12-31 | Stop reason: HOSPADM

## 2020-12-31 RX ORDER — ACETAMINOPHEN 500 MG
1000 TABLET ORAL ONCE
Status: COMPLETED | OUTPATIENT
Start: 2020-12-31 | End: 2020-12-31

## 2020-12-31 RX ORDER — HALOPERIDOL 5 MG/ML
1 INJECTION INTRAMUSCULAR
Status: DISCONTINUED | OUTPATIENT
Start: 2020-12-31 | End: 2020-12-31 | Stop reason: HOSPADM

## 2020-12-31 RX ORDER — SODIUM CHLORIDE 0.9 % (FLUSH) 0.9 %
5-40 SYRINGE (ML) INJECTION AS NEEDED
Status: DISCONTINUED | OUTPATIENT
Start: 2020-12-31 | End: 2020-12-31 | Stop reason: HOSPADM

## 2020-12-31 RX ORDER — DIPHENHYDRAMINE HYDROCHLORIDE 50 MG/ML
12.5 INJECTION, SOLUTION INTRAMUSCULAR; INTRAVENOUS
Status: DISCONTINUED | OUTPATIENT
Start: 2020-12-31 | End: 2020-12-31 | Stop reason: HOSPADM

## 2020-12-31 RX ORDER — SODIUM CHLORIDE, SODIUM LACTATE, POTASSIUM CHLORIDE, CALCIUM CHLORIDE 600; 310; 30; 20 MG/100ML; MG/100ML; MG/100ML; MG/100ML
100 INJECTION, SOLUTION INTRAVENOUS CONTINUOUS
Status: DISCONTINUED | OUTPATIENT
Start: 2020-12-31 | End: 2020-12-31 | Stop reason: HOSPADM

## 2020-12-31 RX ORDER — FENTANYL CITRATE 50 UG/ML
100 INJECTION, SOLUTION INTRAMUSCULAR; INTRAVENOUS
Status: COMPLETED | OUTPATIENT
Start: 2020-12-31 | End: 2020-12-31

## 2020-12-31 RX ADMIN — PROPOFOL 40 MG: 10 INJECTION, EMULSION INTRAVENOUS at 10:58

## 2020-12-31 RX ADMIN — FENTANYL CITRATE 50 MCG: 50 INJECTION, SOLUTION INTRAMUSCULAR; INTRAVENOUS at 09:29

## 2020-12-31 RX ADMIN — MIDAZOLAM 2 MG: 1 INJECTION INTRAMUSCULAR; INTRAVENOUS at 09:30

## 2020-12-31 RX ADMIN — ACETAMINOPHEN 1000 MG: 500 TABLET, FILM COATED ORAL at 09:15

## 2020-12-31 RX ADMIN — ROPIVACAINE HYDROCHLORIDE 30 ML: 5 INJECTION, SOLUTION EPIDURAL; INFILTRATION; PERINEURAL at 09:32

## 2020-12-31 RX ADMIN — LIDOCAINE HYDROCHLORIDE 80 MG: 20 INJECTION, SOLUTION EPIDURAL; INFILTRATION; INTRACAUDAL; PERINEURAL at 10:39

## 2020-12-31 RX ADMIN — Medication 2 G: at 10:35

## 2020-12-31 RX ADMIN — SODIUM CHLORIDE, SODIUM LACTATE, POTASSIUM CHLORIDE, AND CALCIUM CHLORIDE 100 ML/HR: 600; 310; 30; 20 INJECTION, SOLUTION INTRAVENOUS at 09:31

## 2020-12-31 RX ADMIN — PROPOFOL 40 MG: 10 INJECTION, EMULSION INTRAVENOUS at 10:38

## 2020-12-31 RX ADMIN — DEXAMETHASONE SODIUM PHOSPHATE 4 MG: 4 INJECTION, SOLUTION INTRAMUSCULAR; INTRAVENOUS at 09:32

## 2020-12-31 RX ADMIN — ROPIVACAINE HYDROCHLORIDE 15 ML: 5 INJECTION, SOLUTION EPIDURAL; INFILTRATION; PERINEURAL at 09:33

## 2020-12-31 RX ADMIN — DEXAMETHASONE SODIUM PHOSPHATE 2 MG: 4 INJECTION, SOLUTION INTRAMUSCULAR; INTRAVENOUS at 09:33

## 2020-12-31 RX ADMIN — PROPOFOL 180 MCG/KG/MIN: 10 INJECTION, EMULSION INTRAVENOUS at 10:39

## 2020-12-31 NOTE — ANESTHESIA PROCEDURE NOTES
Peripheral Block    Start time: 12/31/2020 9:32 AM  End time: 12/31/2020 9:33 AM  Performed by: Lety Harvey MD  Authorized by: Lety Harvey MD       Pre-procedure: Indications: at surgeon's request and post-op pain management    Preanesthetic Checklist: patient identified, risks and benefits discussed, site marked, timeout performed, anesthesia consent given and patient being monitored    Timeout Time: 09:32          Block Type:   Block Type: Adductor canal  Laterality:  Right  Monitoring:  Responsive to questions, continuous pulse ox, oxygen, frequent vital sign checks and heart rate  Injection Technique:  Single shot  Procedures: ultrasound guided    Patient Position: supine  Prep: chlorhexidine    Location:  Upper thigh  Needle Type:  Stimuplex  Needle Gauge:  21 G  Needle Localization:  Ultrasound guidance  Medication Injected:  Ropivacaine 0.5% with epinephrine 1:200,000 injection, 15 mL (Mixture components: EPINEPHrine HCl (PF) 1 mg/mL (1 mL) Soln, . 005 mL; ropivacaine (PF) 5 mg/mL (0.5 %) Soln, 1 mL)  dexamethasone (DECADRON) 4 mg/mL injection, 2 mg  Med Admin Time: 12/31/2020 9:33 AM    Assessment:  Number of attempts:  1  Injection Assessment:  Incremental injection every 5 mL, negative aspiration for CSF, no paresthesia, ultrasound image on chart, no intravascular symptoms, negative aspiration for blood and local visualized surrounding nerve on ultrasound  Patient tolerance:  Patient tolerated the procedure well with no immediate complications

## 2020-12-31 NOTE — ANESTHESIA PROCEDURE NOTES
Peripheral Block    Start time: 12/31/2020 9:28 AM  End time: 12/31/2020 9:32 AM  Performed by: Raiza Melvin MD  Authorized by: Raiza Melvin MD       Pre-procedure: Indications: at surgeon's request and post-op pain management    Preanesthetic Checklist: patient identified, risks and benefits discussed, site marked, timeout performed, anesthesia consent given and patient being monitored    Timeout Time: 09:28          Block Type:   Block Type:  Popliteal  Laterality:  Right  Monitoring:  Responsive to questions, continuous pulse ox, oxygen, frequent vital sign checks and heart rate  Injection Technique:  Single shot  Procedures: ultrasound guided    Patient Position: left lateral decubitus  Prep: chlorhexidine    Location:  Lower thigh  Needle Type:  Stimuplex  Needle Gauge:  21 G  Needle Localization:  Ultrasound guidance  Medication Injected:  Ropivacaine 0.5% with epinephrine 1:200,000 injection, 30 mL (Mixture components: EPINEPHrine HCl (PF) 1 mg/mL (1 mL) Soln, . 005 mL; ropivacaine (PF) 5 mg/mL (0.5 %) Soln, 1 mL)  dexamethasone (DECADRON) 4 mg/mL injection, 4 mg  Med Admin Time: 12/31/2020 9:32 AM    Assessment:  Number of attempts:  1  Injection Assessment:  Incremental injection every 5 mL, negative aspiration for CSF, no paresthesia, ultrasound image on chart, no intravascular symptoms, negative aspiration for blood and local visualized surrounding nerve on ultrasound  Patient tolerance:  Patient tolerated the procedure well with no immediate complications

## 2020-12-31 NOTE — DISCHARGE INSTRUCTIONS
INSTRUCTIONS FOLLOWING FOOT SURGERY    ACTIVITY  Elevate surgical foot for 48 hours. Use crutches as directed by your doctor. No weight bearing on operative foot, until the nerve block has completely worn off (no numbness, tingling in right leg/foot). Once the nerve block has worn off, you can walk with partial weight bearing in Post-Op shoe as tolerated. Wear post op shoe at all times. Take one full strength aspirin (325 mg) per day, unless you have been told by your primary MD not to or have history of GI ulcers. Get up out of bed frequently. While in the bed, move your legs as much as possible. DIET  Clear liquids until no nausea or vomiting; then light diet for the first day. Advance to regular diet on second day, unless your doctor orders otherwise. PAIN  Take pain medications as directed by your doctor. Call your doctor if pain is NOT relieved by medication. DO NOT take aspirin or blood thinners until directed by your doctor. DRESSING CARE  Keep clean and dry until follow up appointment. CALL YOUR DOCTOR IF YOU HAVE  Excessive bleeding that does not stop after holding mild pressure over the area. Temperature of 101 degrees or above. Loss of sensation - cold, white or blue toes. After general anesthesia or intravenous sedation, for 24 hours or while taking prescription Narcotics:  · Limit your activities  · A responsible adult needs to be with you for the next 24 hours  · Do not drive and operate hazardous machinery  · Do not make important personal or business decisions  · Do not drink alcoholic beverages  · If you have not urinated within 8 hours after discharge, and you are experiencing discomfort from urinary retention, please go to the nearest ED. · If you have sleep apnea and have a CPAP machine, please use it for all naps and sleeping. · Please use caution when taking narcotics and any of your home medications that may cause drowsiness.   *  Please give a list of your current medications to your Primary Care Provider. *  Please update this list whenever your medications are discontinued, doses are      changed, or new medications (including over-the-counter products) are added. *  Please carry medication information at all times in case of emergency situations. These are general instructions for a healthy lifestyle:  No smoking/ No tobacco products/ Avoid exposure to second hand smoke  Surgeon General's Warning:  Quitting smoking now greatly reduces serious risk to your health. Obesity, smoking, and sedentary lifestyle greatly increases your risk for illness  A healthy diet, regular physical exercise & weight monitoring are important for maintaining a healthy lifestyle    You may be retaining fluid if you have a history of heart failure or if you experience any of the following symptoms:  Weight gain of 3 pounds or more overnight or 5 pounds in a week, increased swelling in our hands or feet or shortness of breath while lying flat in bed. Please call your doctor as soon as you notice any of these symptoms; do not wait until your next office visit.

## 2020-12-31 NOTE — ANESTHESIA POSTPROCEDURE EVALUATION
Procedure(s):  RIGHT GREAT TOE  INTERPHALANGEAL FUSION AND 2ND DISTAL TOE AMPUTATION AND 3,4 PROXIMAL INTERPHALANGEAL RESECTION ARTHROPLASTIES AND METATARSOPHALANGEAL RELEASES.    total IV anesthesia    Anesthesia Post Evaluation      Multimodal analgesia: multimodal analgesia used between 6 hours prior to anesthesia start to PACU discharge  Patient location during evaluation: PACU  Patient participation: complete - patient participated  Level of consciousness: awake and alert  Pain management: adequate  Airway patency: patent  Anesthetic complications: no  Cardiovascular status: acceptable  Respiratory status: acceptable, spontaneous ventilation and nonlabored ventilation  Hydration status: acceptable  Post anesthesia nausea and vomiting:  none      INITIAL Post-op Vital signs:   Vitals Value Taken Time   /88 12/31/20 1200   Temp 36.6 °C (97.8 °F) 12/31/20 1141   Pulse 72 12/31/20 1200   Resp 16 12/31/20 1141   SpO2 98 % 12/31/20 1200   Vitals shown include unvalidated device data.

## 2020-12-31 NOTE — OP NOTES
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 Right great toe IP joint arthrodesis, 23382                            2.  Right first MTP joint capsulotomy, 52601                            3.  Right second toe amputation through distal interphalangeal joint, 38385                            4.  Right third and fourth proximal interphalangeal joint resection arthroplasties, 63356X0                            5.  Right third and fourth MTP joint capsulotomies with extensor tendon lengthenings, 60365E1    SURGEON: Mia Montejo MD    HARDWARE:   Implant Name Type Inv. Item Serial No.  Lot No. LRB No. Used Action   STAPLE INT [id number] WIRE 1.5X1.5MM HND WRST NIT SPD CONT - [id number]  STAPLE INT [id number] WIRE 1.5X1.5MM HND WRST NIT SPD CONT  DEPUY SYNTHES [facility] [id number] Right 2 Implanted   STAPLE INT [id number] WIRE 1.5X1.5MM HND WRST NIT SPD CONT - [id number]  STAPLE INT [id number] WIRE 1.5X1.5MM HND WRST NIT SPD CONT  DEPUY SYNTHES [facility] [id number] Right 1 Implanted   SCREW BNE SHT THRD 3X26 MM 7 MM TOSHIA HDLSS TI - [id number]  SCREW BNE SHT THRD 3X26 MM 7 MM TOSHIA HDLSS TI  DEPUY SYNTHES [facility]_ [id number] Right 1 Implanted   SCREW BNE SHT THRD 3X30 MM 8 MM TOSHIA HDLSS TI - [id number]  SCREW BNE SHT THRD 3X30 MM 8 MM TOSHIA HDLSS TI  DEPUY SYNTHES [facility]_ [id number] Right 1 Implanted     INDICATIONS: This patient is a 47-year-old male with a symptomatic right great claw toe and third and fourth claw toe deformities as well as a deformity from a previous attempt of a fusion of his second distal interphalangeal joint which he bent the screw. He presents today for revision.   Risks and benefits  of the procedure including but not limited to risk of anesthetic complications as well as surgical complications can damage nerves and blood vessels, risk of infection, was incomplete pain relief, risk of malunion, risk of nonunion, and need for additional surgery discussed with the patient. He understands the risks and wishes to proceed with surgery at this time. PROCEDURE IN DETAIL: A time out was done to confirm the operating procedure, surgeon, patient and site. A block was placed by the department of anesthesia. In a preop surgical timeout the right lower extremity identified correct surgical site and prepped draped in a standard sterile fashions and ChloraPrep solution. An elliptical incision was made over the IP joint of the great toe. A sagittal saw was used to create an arthrodesis on both sides of the joint which was pinned in desired clinical alignment. 2 Synthes compression staples were placed across the joint at that time for the dorsal incision to avoid the previous screw inside the proximal phalanx. An MTP joint capsulotomy distention tendon lengthening was performed through a separate incision. A second toe amputation was performed through the DIP joint at that time. PIP resection arthroplasties of the third and fourth toes were performed elliptical incisions and secured using Synthes headless screws under fluoroscopic guidance. Additionally capsulotomies of third and fourth toes were also performed at that time using a #15 blade. The wounds were then irrigated and closed using Monocryl nylon sutures. Sterile dressings then applied. Anesthesia was discontinued. The patient was transferred to recovery bed. He was taken recovery in satisfactory condition. He appeared to tolerate the procedure well. There were no apparent surgical or anesthetic complications. All needle and sponge counts were correct. TOURNIQUET TIME: Approx 30 minutes. SPECIMENS: none    ESTIMATED BLOOD LOSS: min mL.

## 2020-12-31 NOTE — ANESTHESIA PREPROCEDURE EVALUATION
Relevant Problems   No relevant active problems       Anesthetic History   No history of anesthetic complications            Review of Systems / Medical History  Patient summary reviewed, nursing notes reviewed and pertinent labs reviewed    Pulmonary          Smoker (Distant h/o smoking (quit 1995))         Neuro/Psych   Within defined limits           Cardiovascular    Hypertension: well controlled              Exercise tolerance: >4 METS  Comments: TTE 2019- Normal EF no significant valve abnl   GI/Hepatic/Renal     GERD: well controlled           Endo/Other    Diabetes: type 2    Arthritis (rheumatoid)     Other Findings              Physical Exam    Airway  Mallampati: II    Neck ROM: normal range of motion   Mouth opening: Normal     Cardiovascular  Regular rate and rhythm,  S1 and S2 normal,  no murmur, click, rub, or gallop             Dental  No notable dental hx       Pulmonary  Breath sounds clear to auscultation               Abdominal         Other Findings            Anesthetic Plan    ASA: 2  Anesthesia type: total IV anesthesia - femoral single shot and popliteal fossa block      Post-op pain plan if not by surgeon: peripheral nerve block single    Induction: Intravenous  Anesthetic plan and risks discussed with: Patient

## 2020-12-31 NOTE — BRIEF OP NOTE
Brief Postoperative Note    Patient: Jame Page III  YOB: 1960  MRN: [id number]    Date of Procedure: 12/31/2020     Pre-Op Diagnosis: Acquired hallux valgus of right foot [M20.11]  Acquired claw toe, right [M20.5X1]    Post-Op Diagnosis: Same as preoperative diagnosis. Procedure(s):  RIGHT GREAT TOE  INTERPHALANGEAL FUSION AND 2ND DISTAL TOE AMPUTATION AND 3,4 PROXIMAL INTERPHALANGEAL RESECTION ARTHROPLASTIES AND METATARSOPHALANGEAL RELEASES    Surgeon(s):  Magdalena Garner MD    Surgical Assistant: None    Anesthesia: Regional     Estimated Blood Loss (mL): Minimal    Complications: None    Specimens: * No specimens in log *     Implants:   Implant Name Type Inv.  Item Serial No.  Lot No. LRB No. Used Action   STAPLE INT [id number] WIRE 1.5X1.5MM HND WRST NIT SPD CONT - [id number]  STAPLE INT [id number] WIRE 1.5X1.5MM HND WRST NIT SPD CONT  [employer] [id number] Right 2 Implanted   STAPLE INT [id number] WIRE 1.5X1.5MM HND WRST NIT SPD CONT - [id number]  STAPLE INT [id number] WIRE 1.5X1.5MM HND WRST NIT SPD CONT  [employer] [id number] Right 1 Implanted   SCREW BNE SHT THRD 3X26 MM 7 MM TOSHIA HDLSS TI - [id number]  SCREW BNE SHT THRD 3X26 MM 7 MM TOSHIA HDLSS TI  DEPUY [employer] [id number] Right 1 Implanted   SCREW BNE SHT THRD 3X30 MM 8 MM TOSHIA HDLSS TI - [id number]  SCREW BNE SHT THRD 3X30 MM 8 MM TOSHIA HDLSS TI  DEPUY [employer]_ [id number] Right 1 Implanted       Drains: * No LDAs found *    Findings:     Electronically Signed by Jesus Manuel Owusu MD on 12/31/2020 at 11:28 AM

## 2021-01-04 NOTE — ADDENDUM NOTE
Addendum  created 01/04/21 0819 by Carol Wills CRNA    Flowsheet accepted, Intraprocedure Flowsheets edited

## 2021-03-31 ENCOUNTER — ANESTHESIA EVENT (OUTPATIENT)
Dept: SURGERY | Age: 61
End: 2021-03-31
Payer: COMMERCIAL

## 2021-04-01 ENCOUNTER — ANESTHESIA (OUTPATIENT)
Dept: SURGERY | Age: 61
End: 2021-04-01
Payer: COMMERCIAL

## 2021-04-01 ENCOUNTER — HOSPITAL ENCOUNTER (OUTPATIENT)
Age: 61
Setting detail: OUTPATIENT SURGERY
Discharge: HOME | End: 2021-04-01
Attending: ORTHOPAEDIC SURGERY | Admitting: ORTHOPAEDIC SURGERY
Payer: COMMERCIAL

## 2021-04-01 ENCOUNTER — APPOINTMENT (OUTPATIENT)
Dept: GENERAL RADIOLOGY | Age: 61
End: 2021-04-01
Attending: ORTHOPAEDIC SURGERY
Payer: COMMERCIAL

## 2021-04-01 VITALS
RESPIRATION RATE: 14 BRPM | OXYGEN SATURATION: 92 % | DIASTOLIC BLOOD PRESSURE: 87 MMHG | SYSTOLIC BLOOD PRESSURE: 146 MMHG | HEART RATE: 68 BPM | WEIGHT: 245.38 LBS | TEMPERATURE: 98 F | HEIGHT: 76 IN | BODY MASS INDEX: 29.88 KG/M2

## 2021-04-01 DIAGNOSIS — T84.84XA PAINFUL ORTHOPAEDIC HARDWARE (HCC): Primary | ICD-10-CM

## 2021-04-01 LAB
GLUCOSE BLD STRIP.AUTO-MCNC: 103 MG/DL (ref 65–100)
POTASSIUM BLD-SCNC: 3.6 MMOL/L (ref 3.5–5.1)
SERVICE CMNT-IMP: ABNORMAL

## 2021-04-01 PROCEDURE — 76060000032 HC ANESTHESIA 0.5 TO 1 HR: Performed by: ORTHOPAEDIC SURGERY

## 2021-04-01 PROCEDURE — 82962 GLUCOSE BLOOD TEST: CPT

## 2021-04-01 PROCEDURE — 20680 REMOVAL OF IMPLANT DEEP: CPT | Performed by: ORTHOPAEDIC SURGERY

## 2021-04-01 PROCEDURE — 74011250636 HC RX REV CODE- 250/636: Performed by: ANESTHESIOLOGY

## 2021-04-01 PROCEDURE — 28010 INCISION OF TOE TENDON: CPT | Performed by: ORTHOPAEDIC SURGERY

## 2021-04-01 PROCEDURE — 76010000160 HC OR TIME 0.5 TO 1 HR INTENSV-TIER 1: Performed by: ORTHOPAEDIC SURGERY

## 2021-04-01 PROCEDURE — 77030003602 HC NDL NRV BLK BBMI -B: Performed by: ANESTHESIOLOGY

## 2021-04-01 PROCEDURE — 84132 ASSAY OF SERUM POTASSIUM: CPT

## 2021-04-01 PROCEDURE — 76210000020 HC REC RM PH II FIRST 0.5 HR: Performed by: ORTHOPAEDIC SURGERY

## 2021-04-01 PROCEDURE — 77030002916 HC SUT ETHLN J&J -A: Performed by: ORTHOPAEDIC SURGERY

## 2021-04-01 PROCEDURE — 28825 PARTIAL AMPUTATION OF TOE: CPT | Performed by: ORTHOPAEDIC SURGERY

## 2021-04-01 PROCEDURE — 76010010054 HC POST OP PAIN BLOCK: Performed by: ORTHOPAEDIC SURGERY

## 2021-04-01 PROCEDURE — 77030000032 HC CUF TRNQT ZIMM -B: Performed by: ORTHOPAEDIC SURGERY

## 2021-04-01 PROCEDURE — 74011000250 HC RX REV CODE- 250: Performed by: ORTHOPAEDIC SURGERY

## 2021-04-01 PROCEDURE — 74011000250 HC RX REV CODE- 250: Performed by: NURSE ANESTHETIST, CERTIFIED REGISTERED

## 2021-04-01 PROCEDURE — 74011250636 HC RX REV CODE- 250/636: Performed by: ORTHOPAEDIC SURGERY

## 2021-04-01 PROCEDURE — 74011250636 HC RX REV CODE- 250/636: Performed by: NURSE ANESTHETIST, CERTIFIED REGISTERED

## 2021-04-01 PROCEDURE — 76210000063 HC OR PH I REC FIRST 0.5 HR: Performed by: ORTHOPAEDIC SURGERY

## 2021-04-01 PROCEDURE — 2709999900 HC NON-CHARGEABLE SUPPLY: Performed by: ORTHOPAEDIC SURGERY

## 2021-04-01 RX ORDER — CEFAZOLIN SODIUM/WATER 2 G/20 ML
2 SYRINGE (ML) INTRAVENOUS ONCE
Status: COMPLETED | OUTPATIENT
Start: 2021-04-01 | End: 2021-04-01

## 2021-04-01 RX ORDER — NALOXONE HYDROCHLORIDE 0.4 MG/ML
0.1 INJECTION, SOLUTION INTRAMUSCULAR; INTRAVENOUS; SUBCUTANEOUS AS NEEDED
Status: DISCONTINUED | OUTPATIENT
Start: 2021-04-01 | End: 2021-04-01 | Stop reason: HOSPADM

## 2021-04-01 RX ORDER — SODIUM CHLORIDE, SODIUM LACTATE, POTASSIUM CHLORIDE, CALCIUM CHLORIDE 600; 310; 30; 20 MG/100ML; MG/100ML; MG/100ML; MG/100ML
75 INJECTION, SOLUTION INTRAVENOUS CONTINUOUS
Status: DISCONTINUED | OUTPATIENT
Start: 2021-04-01 | End: 2021-04-01 | Stop reason: HOSPADM

## 2021-04-01 RX ORDER — CEPHALEXIN 500 MG/1
500 CAPSULE ORAL 4 TIMES DAILY
Qty: 12 CAP | Refills: 0 | Status: SHIPPED | OUTPATIENT
Start: 2021-04-01 | End: 2021-06-15

## 2021-04-01 RX ORDER — MIDAZOLAM HYDROCHLORIDE 1 MG/ML
2 INJECTION, SOLUTION INTRAMUSCULAR; INTRAVENOUS
Status: COMPLETED | OUTPATIENT
Start: 2021-04-01 | End: 2021-04-01

## 2021-04-01 RX ORDER — SODIUM CHLORIDE, SODIUM LACTATE, POTASSIUM CHLORIDE, CALCIUM CHLORIDE 600; 310; 30; 20 MG/100ML; MG/100ML; MG/100ML; MG/100ML
1000 INJECTION, SOLUTION INTRAVENOUS CONTINUOUS
Status: DISCONTINUED | OUTPATIENT
Start: 2021-04-01 | End: 2021-04-01 | Stop reason: HOSPADM

## 2021-04-01 RX ORDER — FENTANYL CITRATE 50 UG/ML
100 INJECTION, SOLUTION INTRAMUSCULAR; INTRAVENOUS AS NEEDED
Status: DISCONTINUED | OUTPATIENT
Start: 2021-04-01 | End: 2021-04-01 | Stop reason: HOSPADM

## 2021-04-01 RX ORDER — PROPOFOL 10 MG/ML
INJECTION, EMULSION INTRAVENOUS
Status: DISCONTINUED | OUTPATIENT
Start: 2021-04-01 | End: 2021-04-01 | Stop reason: HOSPADM

## 2021-04-01 RX ORDER — OXYCODONE HYDROCHLORIDE 5 MG/1
10 TABLET ORAL
Status: DISCONTINUED | OUTPATIENT
Start: 2021-04-01 | End: 2021-04-01 | Stop reason: HOSPADM

## 2021-04-01 RX ORDER — PROPOFOL 10 MG/ML
INJECTION, EMULSION INTRAVENOUS AS NEEDED
Status: DISCONTINUED | OUTPATIENT
Start: 2021-04-01 | End: 2021-04-01 | Stop reason: HOSPADM

## 2021-04-01 RX ORDER — SODIUM CHLORIDE 0.9 % (FLUSH) 0.9 %
5-40 SYRINGE (ML) INJECTION AS NEEDED
Status: DISCONTINUED | OUTPATIENT
Start: 2021-04-01 | End: 2021-04-01 | Stop reason: HOSPADM

## 2021-04-01 RX ORDER — OXYCODONE HYDROCHLORIDE 5 MG/1
5 TABLET ORAL
Status: DISCONTINUED | OUTPATIENT
Start: 2021-04-01 | End: 2021-04-01 | Stop reason: HOSPADM

## 2021-04-01 RX ORDER — HYDROMORPHONE HYDROCHLORIDE 1 MG/ML
0.5 INJECTION, SOLUTION INTRAMUSCULAR; INTRAVENOUS; SUBCUTANEOUS
Status: DISCONTINUED | OUTPATIENT
Start: 2021-04-01 | End: 2021-04-01 | Stop reason: HOSPADM

## 2021-04-01 RX ORDER — LIDOCAINE HYDROCHLORIDE 10 MG/ML
0.1 INJECTION INFILTRATION; PERINEURAL AS NEEDED
Status: DISCONTINUED | OUTPATIENT
Start: 2021-04-01 | End: 2021-04-01 | Stop reason: HOSPADM

## 2021-04-01 RX ORDER — ROPIVACAINE HYDROCHLORIDE 5 MG/ML
INJECTION, SOLUTION EPIDURAL; INFILTRATION; PERINEURAL
Status: COMPLETED | OUTPATIENT
Start: 2021-04-01 | End: 2021-04-01

## 2021-04-01 RX ORDER — LIDOCAINE HYDROCHLORIDE 20 MG/ML
INJECTION, SOLUTION EPIDURAL; INFILTRATION; INTRACAUDAL; PERINEURAL AS NEEDED
Status: DISCONTINUED | OUTPATIENT
Start: 2021-04-01 | End: 2021-04-01 | Stop reason: HOSPADM

## 2021-04-01 RX ORDER — OXYCODONE HYDROCHLORIDE 5 MG/1
5 TABLET ORAL
Qty: 30 TAB | Refills: 0 | Status: SHIPPED | OUTPATIENT
Start: 2021-04-01 | End: 2021-04-04

## 2021-04-01 RX ORDER — SODIUM CHLORIDE 0.9 % (FLUSH) 0.9 %
5-40 SYRINGE (ML) INJECTION EVERY 8 HOURS
Status: DISCONTINUED | OUTPATIENT
Start: 2021-04-01 | End: 2021-04-01 | Stop reason: HOSPADM

## 2021-04-01 RX ORDER — DIPHENHYDRAMINE HYDROCHLORIDE 50 MG/ML
12.5 INJECTION, SOLUTION INTRAMUSCULAR; INTRAVENOUS ONCE
Status: DISCONTINUED | OUTPATIENT
Start: 2021-04-01 | End: 2021-04-01 | Stop reason: HOSPADM

## 2021-04-01 RX ORDER — ONDANSETRON 2 MG/ML
4 INJECTION INTRAMUSCULAR; INTRAVENOUS ONCE
Status: DISCONTINUED | OUTPATIENT
Start: 2021-04-01 | End: 2021-04-01 | Stop reason: HOSPADM

## 2021-04-01 RX ORDER — BUPIVACAINE HYDROCHLORIDE 5 MG/ML
INJECTION, SOLUTION EPIDURAL; INTRACAUDAL AS NEEDED
Status: DISCONTINUED | OUTPATIENT
Start: 2021-04-01 | End: 2021-04-01 | Stop reason: HOSPADM

## 2021-04-01 RX ADMIN — ROPIVACAINE HYDROCHLORIDE 20 ML: 150 INJECTION, SOLUTION EPIDURAL; INFILTRATION; PERINEURAL at 10:22

## 2021-04-01 RX ADMIN — CEFAZOLIN 2 G: 1 INJECTION, POWDER, FOR SOLUTION INTRAVENOUS at 13:06

## 2021-04-01 RX ADMIN — PROPOFOL 50 MG: 10 INJECTION, EMULSION INTRAVENOUS at 12:59

## 2021-04-01 RX ADMIN — SODIUM CHLORIDE, SODIUM LACTATE, POTASSIUM CHLORIDE, AND CALCIUM CHLORIDE 1000 ML: 600; 310; 30; 20 INJECTION, SOLUTION INTRAVENOUS at 09:55

## 2021-04-01 RX ADMIN — LIDOCAINE HYDROCHLORIDE 40 MG: 20 INJECTION, SOLUTION EPIDURAL; INFILTRATION; INTRACAUDAL; PERINEURAL at 12:59

## 2021-04-01 RX ADMIN — MIDAZOLAM 2 MG: 1 INJECTION INTRAMUSCULAR; INTRAVENOUS at 10:16

## 2021-04-01 RX ADMIN — PROPOFOL 100 MCG/KG/MIN: 10 INJECTION, EMULSION INTRAVENOUS at 12:59

## 2021-04-01 RX ADMIN — FENTANYL CITRATE 100 MCG: 50 INJECTION, SOLUTION INTRAMUSCULAR; INTRAVENOUS at 10:16

## 2021-04-01 NOTE — H&P
Outpatient Surgery History and Physical:  Delma Kay III was seen and examined. CHIEF COMPLAINT:   Right foot  PE:     Visit Vitals  /69 (BP 1 Location: Right upper arm, BP Patient Position: At rest)   Pulse 67   Temp 97.6 °F (36.4 °C)   Resp 16   Ht 6' 4\" (1.93 m) Comment: Pt states   Wt 245 lb 6 oz (111.3 kg)   SpO2 99%   BMI 29.87 kg/m²       Heart:   Regular rhythm      Lungs:  Are clear      Past Medical History: There are no active problems to display for this patient. Surgical History:   Past Surgical History:   Procedure Laterality Date    HX COLONOSCOPY      HX LUMBAR LAMINECTOMY      HX ORTHOPAEDIC Right 2019    2cd toe hammer toe last yr    HX TONSILLECTOMY  age 1       Social History: Patient  reports that he quit smoking about 25 years ago. He has a 37.50 pack-year smoking history. He has never used smokeless tobacco. He reports current alcohol use of about 4.0 standard drinks of alcohol per week. He reports previous drug use. Family History:   Family History   Problem Relation Age of Onset   Champ Re Stroke Father     Hypertension Father        Allergies: Reviewed per EMR  No Known Allergies    Medications:    No current facility-administered medications on file prior to encounter. Current Outpatient Medications on File Prior to Encounter   Medication Sig    cephALEXin (KEFLEX) 500 mg capsule Take 1 Cap by mouth four (4) times daily.  gabapentin (NEURONTIN) 600 mg tablet Take 1,200 mg by mouth two (2) times a day.  allopurinol (ZYLOPRIM) 100 mg tablet Take 100 mg by mouth two (2) times a day.  metFORMIN (GLUCOPHAGE) 500 mg tablet Take 500 mg by mouth two (2) times daily (with meals).  amLODIPine (NORVASC) 2.5 mg tablet Take 2.5 mg by mouth every morning.  hydrochlorothiazide (HYDRODIURIL) 25 mg tablet Take 25 mg by mouth daily.  metoprolol (LOPRESSOR) 25 mg tablet Take  by mouth two (2) times a day.     OMEPRAZOLE PO Take 20 mg by mouth every morning.  evolocumab (REPATHA SURECLICK SC) by SubCUTAneous route every fourteen (14) days. The surgery is planned for the    Procedure: RIGHT THIRD , FOURTH TOE HARDWARE REMOVAL WITH  AMPUTATION OF THIRD TOE AND FOURTH FLEXOR DIGITORUM LONGUS TENOTOMY    History and physical has been reviewed. The patient has been examined. There have been no significant clinical changes since the completion of the originally dated History and Physical.  Patient identified by surgeon; surgical site was confirmed by patient and surgeon. The patient is here today for outpatient surgery. I have examined the patient, no changes are noted in the patient's medical status. Necessity for the procedure/care is still present and the history and physical above is current. See the office notes for the full long term history of the problem. Please see the recent office notes for the musculoskeletal examination.     Signed By: Major Sheth MD     April 1, 2021 12:13 PM

## 2021-04-01 NOTE — ANESTHESIA PREPROCEDURE EVALUATION
Relevant Problems   No relevant active problems       Anesthetic History   No history of anesthetic complications            Review of Systems / Medical History  Patient summary reviewed, nursing notes reviewed and pertinent labs reviewed    Pulmonary          Smoker (Distant h/o smoking (quit 1995))         Neuro/Psych   Within defined limits           Cardiovascular    Hypertension: well controlled              Exercise tolerance: >4 METS  Comments: TTE 2019- Normal EF no significant valve abnl   GI/Hepatic/Renal     GERD: well controlled           Endo/Other    Diabetes: type 2    Arthritis (rheumatoid)     Other Findings              Physical Exam    Airway  Mallampati: II    Neck ROM: normal range of motion   Mouth opening: Normal     Cardiovascular  Regular rate and rhythm,  S1 and S2 normal,  no murmur, click, rub, or gallop             Dental  No notable dental hx       Pulmonary  Breath sounds clear to auscultation               Abdominal         Other Findings            Anesthetic Plan    ASA: 2  Anesthesia type: total IV anesthesia and general - backup - femoral single shot and popliteal fossa block      Post-op pain plan if not by surgeon: peripheral nerve block single    Induction: Intravenous  Anesthetic plan and risks discussed with: Patient

## 2021-04-01 NOTE — OP NOTES
FULL OP NOTE    PATIENT NAME: Lucia Jamil III  MRN: [id number]    DATE OF SURGERY: 4/1/2021    PREOPERATIVE DIAGNOSIS: Hardware complicating wound infection, initial encounter ([facility] Annalisa Trejo. 7XXA]  Acquired claw toe, right [M20.5X1]      POSTOPERATIVE DIAGNOSIS: Hardware complicating wound infection, initial encounter ([facility] [V53. 7XXA]  Acquired claw toe, right [M20.5X1      PROCEDURE: 1. Right third and fourth toe hardware removal, 20680x2                             2.  Right third toe amputation through interphalangeal joint, 64645                             3.  Right fourth flexor digitorum longus tendon tenotomy, 29661              SURGEON: Hilbert Dakins, MD    HARDWARE: * No implants in log *  INDICATIONS: This patient is a 56y.o. year old male with a history of Hardware complicating wound infection, initial encounter ([facility] Annalisa Trejo. 7XXA]  Acquired claw toe, right [M20.5X1] who has failed conservative therapy and desires surgical treatment. Risks and benefits of the procedure including, but not limited to, anesthetic complications as well as surgical complications including damage to nerves and blood vessels, risk of infection, risk of incomplete pain relief, risk of malunion, nonunion and need for additional surgery have been discussed with the patient who wishes to proceed. PROCEDURE IN DETAIL: A time out was done to confirm the operating procedure, surgeon, patient and site. An ankle block was performed by the department of anesthesia. The patient was brought to the operating table placed supine. During a preop surgical timeout the right lower extremity was identified as a correct surgical site and prepped draped in a standard sterile fashions and ChloraPrep solution. The screws were removed from both the third and the fourth toe proximal interphalangeal joints without difficulty. There is no sign of deep infection identified.   An amputation of the third toe was performed through the distal interphalangeal joint. A rongeur was then used to take back bone to allow for a loose closure of the joint. The digital nerves and arteries are carefully protected. The flexor digitorum longus tendon was then cut with good correction of the mallet toe deformity noted. The joint was then closed using Monocryl and nylon sutures. A flexor digitorum longus he was then performed through a plantar approach the fourth metatarsal phalangeal joint with good correction of the patient's mallet toe deformity. The fourth toe hardware was also removed through an incision through the distal interphalangeal joint. These incisions were then closed using nylon sutures. A sterile dressing was then applied. Anesthesia was discontinued. The patient was transferred back to recovery bed. He was taken recovery in satisfactory condition. He appeared to tolerate the procedure well. There were no apparent surgical or anesthetic complications. All needle and sponge counts are correct. TOURNIQUET TIME: Approx 13 minutes. SPECIMENS: none    ESTIMATED BLOOD LOSS: min mL.

## 2021-04-01 NOTE — ANESTHESIA PROCEDURE NOTES
Peripheral Block    Start time: 4/1/2021 10:16 AM  End time: 4/1/2021 10:19 AM  Performed by: Emery Zapata MD  Authorized by: Emery Zapata MD       Pre-procedure: Indications: at surgeon's request, post-op pain management and procedure for pain    Preanesthetic Checklist: patient identified, risks and benefits discussed, site marked, timeout performed, anesthesia consent given and patient being monitored    Timeout Time: 10:16          Block Type:   Block Type:   Ankle  Laterality:  Right  Monitoring:  Standard ASA monitoring, responsive to questions, continuous pulse ox, oxygen, frequent vital sign checks and heart rate  Injection Technique:  Single shot  Procedures comment:  Anatomical [place]  Patient Position: supine  Prep: chlorhexidine    Location:  Foot  Needle Type:  Stimuplex  Needle Gauge:  22 G  Needle Localization:  Anatomical landmarks  Medication Injected:  Ropivacaine (PF) (NAROPIN)(0.5%) 5 mg/mL injection, 20 mL  Med Admin Time: 4/1/2021 10:22 AM    Assessment:  Number of attempts:  1  Injection Assessment:  Incremental injection every 5 mL, negative aspiration for blood, no intravascular symptoms and no paresthesia  Patient tolerance:  Patient tolerated the procedure well with no immediate complications

## 2021-04-01 NOTE — DISCHARGE INSTRUCTIONS
INSTRUCTIONS FOLLOWING FOOT SURGERY    ACTIVITY  Elevate foot. No Ice  No weight bearing. Use crutches or knee walker until seen in follow up appointment  Don't put anything into the splint to relieve itching etc. Take one 325mg aspirin daily if okay with your medical doctor and you have no GI ulcer. Get up and out of bed frequently. While in bed move the legs as much as possible)    DRESSING CARE Keep dry and in place until follow up appointment    CALL YOUR DOCTOR IF YOU HAVE  Excessive bleeding that does not stop after holding mild pressure over the area. Temperature of 101 degrees or above. Redness, excessive swelling or bruising, and/or green or yellow, smelly discharge from incision. Loss of sensation - cold, white or blue toes. DIET  Day of Surgery: Clear liquids until no nausea or vomiting; then light, bland diet (Baked chicken, plain rice, grits, scrambled egg, toast). Nothing Greasy, fried or spicy today  Advance to regular diet on second day, unless your doctor orders otherwise. PAIN  Take pain medications as directed by your doctor. Call your doctor if pain is NOT relieved by medication. PAIN MED SIDE EFFECTS  Constipation: Lots of fluids, try prune juice, then OTC stool softeners if necessary  Nausea: Take medication with food. AFTER ANESTHESIA  For the first 24 hours and while taking narcotics for pain: DO NOT Drive, Drink Alcoholic beverages, or make important Decisions. Be aware of dizziness following anesthesia and while taking pain medication. Preventing Infection at Home  We care about preventing infection and avoiding the spread of germs - not only when you are in the hospital but also when you return home. When you return home from the hospital, its important to take the following steps to help prevent infection and avoid spreading germs that could infect you and others. Ask everyone in your home to follow these guidelines, too.     Clean Your Hands  · Clean your hands whenever your hands are visibly dirty, before you eat, before or after touching your mouth, nose or eyes, and before preparing food. Clean them after contact with body fluids, using the restroom, touching animals or changing diapers. · When washing hands, wet them with warm water and work up a lather. Rub hands for at least 15 seconds, then rinse them and pat them dry with a clean towel or paper towel. · When using hand sanitizers, it should take about 15 seconds to rub your hands dry. If not, you probably didnt apply enough . Cover Your Sneeze or Cough  Germs are released into the air whenever you sneeze or cough. To prevent the spread of infection:  · Turn away from other people before coughing or sneezing. · Cover your mouth or nose with a tissue when you cough or sneeze. Put the tissue in the trash. · If you dont have a tissue, cough or sneeze into your upper sleeve, not your hands. · Always clean your hands after coughing or sneezing. Care for Wounds  Your skin is your bodys first line of defense against germs, but an open wound leaves an easy way for germs to enter your body. To prevent infection:  · Clean your hands before and after changing wound dressings, and wear gloves to change dressings if recommended by your doctor. · Take special care with IV lines or other devices inserted into the body. If you must touch them, clean your hands first.  · Follow any specific instructions from your doctor to care for your wounds. Contact your doctor if you experience any signs of infection, such as fever or increased redness at the surgical or wound site. Keep a Clean Home  · Clean or wipe commonly touched hard surfaces like door handles, sinks, tabletops, phones and TV remotes. · Use products labeled disinfectant to kill harmful bacteria and viruses. · Use a clean cloth or paper towel to clean and dry surfaces.  Wiping surfaces with a dirty dishcloth, sponge or towel will only spread germs.  · Never share toothbrushes, mccray, drinking glasses, utensils, razor blades, face cloths or bath towels to avoid spreading germs. · Be sure that the linens that you sleep on are clean. · Keep pets away from wounds and wash your hands after touching pets, their toys or bedding. We care about you and your health. Remember, preventing infections is a team effort between you, your family, friends and health care providers. DISCHARGE SUMMARY from Nurse    PATIENT INSTRUCTIONS:    After general anesthesia or intravenous sedation, for 24 hours or while taking prescription Narcotics:  · Limit your activities  · Do not drive and operate hazardous machinery  · Do not make important personal or business decisions  · Do  not drink alcoholic beverages  · If you have not urinated within 8 hours after discharge, please contact your surgeon on call. *  Please give a list of your current medications to your Primary Care Provider. *  Please update this list whenever your medications are discontinued, doses are      changed, or new medications (including over-the-counter products) are added. *  Please carry medication information at all times in case of emergency situations. These are general instructions for a healthy lifestyle:    No smoking/ No tobacco products/ Avoid exposure to second hand smoke    Surgeon General's Warning:  Quitting smoking now greatly reduces serious risk to your health. Obesity, smoking, and sedentary lifestyle greatly increases your risk for illness    A healthy diet, regular physical exercise & weight monitoring are important for maintaining a healthy lifestyle    You may be retaining fluid if you have a history of heart failure or if you experience any of the following symptoms:  Weight gain of 3 pounds or more overnight or 5 pounds in a week, increased swelling in our hands or feet or shortness of breath while lying flat in bed.   Please call your doctor as soon as you notice any of these symptoms; do not wait until your next office visit. Recognize signs and symptoms of STROKE:    F-face looks uneven    A-arms unable to move or move unevenly    S-speech slurred or non-existent    T-time-call 911 as soon as signs and symptoms begin-DO NOT go       Back to bed or wait to see if you get better-TIME IS BRAIN. Learning About How to Use Crutches  Your Care Instructions  Crutches can help you walk when you have an injured hip, leg, knee, ankle, or foot. Your doctor will tell you how much weight--if any--you can put on your leg. Be sure your crutches fit you. When you stand up in your normal posture, there should be space for two or three fingers between the top of the crutch and your armpit. When you let your hands hang down, the hand  should be at your wrists. When you put your hands on the hand , your elbows should be slightly bent. To stay safe when using crutches:  · Look straight ahead, not down at your feet. · Clear away small rugs, cords, or anything else that could cause you to trip, slip, or fall. · Be very careful around pets and small children. They can get in your path when you least expect it. · Be sure the rubber tips on your crutches are clean and in good condition to help prevent slipping. · Avoid slick conditions, such as wet floors and snowy or icy driveways. In bad weather, be especially careful on curbs and steps. How to use crutches  Getting ready to walk    1. Bend your elbows slightly. Press the padded top parts of the crutches against your sides, under your armpits. 2. If you have been told not to put any weight on your injured leg, keep that leg bent and off the ground. Walking with crutches    1. Put both crutches about 12 inches in front of you. 2. Put your weight on the handgrips, not on the pads under your arms. (Constant pressure against your underarms can cause numbness.) Swing your body forward.  (If you have been told not to put any weight on your injured leg, keep that leg bent and off the ground.)  3. To complete the step, put your weight on the strong leg. 4. Move your crutches about 12 inches in front of you, and start the next step. 5. When you're confident using the crutches, you can move the crutches and your injured leg at the same time. Then push straight down on the crutches as you step past the crutches with your strong leg, as you would in normal walking. 6. Take small steps. 7. Use ramps and elevators when you can. Sitting down    1. To sit, back up to the chair. Use one hand to hold both crutches by the handgrips, beside your injured leg. With the other hand, hold onto the seat and slowly lower yourself onto the chair. 2. Lay the crutches on the ground near your chair. If you prop them up, they may fall over. Getting up from a chair    1. To get up from a chair,  the crutches and put them in one hand beside your injured leg. 2. Put your weight on the handgrips of the crutches and on your strong leg to stand up. Walking up stairs    1. To go up stairs, step up with your strong leg and then bring the crutches and your injured leg to the upper step. 2. For stairs that have handrails: Put both crutches under the arm opposite the handrail. Use the hand opposite the handrail to hold both crutches by the handgrips. 3. Hold onto the handrail as you go up. Put your strong leg on the step first when you go up. Walking down stairs    1. To go down stairs, put your crutches and injured leg on the lower step. 2. Bring your strong leg to the lower step. This saying may help you remember: \"Up with the good, down with the bad. \"  3. For stairs that have handrails: Put both crutches under the arm opposite the handrail. Use the hand opposite the handrail to hold both crutches by the handgrips. Hold onto the handrail as you go down.  Follow the same process you use for stairs: Lead with your crutches and injured leg on the way down.  Follow-up care is a key part of your treatment and safety. Be sure to make and go to all appointments, and call your doctor if you are having problems. It's also a good idea to know your test results and keep a list of the medicines you take. Where can you learn more? Go to http://www.gray.com/. Enter P098 in the search box to learn more about \"Learning About How to Use Crutches. \"  Current as of: August 4, 2016  Content Version: 11.2  © 5128-3964 [employer]. Care instructions adapted under license by [employer] (which disclaims liability or warranty for this information). If you have questions about a medical condition or this instruction, always ask your healthcare professional. [address] any warranty or liability for your use of this information. Learning About Coronavirus [phone])  Coronavirus [phone]): Overview  What is coronavirus (COVID-19)? The coronavirus disease (COVID-19) is caused by a virus. It is an illness that was first found in [place], [place], in December 2019. It has since spread worldwide. The virus can cause fever, cough, and trouble breathing. In severe cases, it can cause pneumonia and make it hard to breathe without help. It can cause death. Coronaviruses are a large group of viruses. They cause the common cold. They also cause more serious illnesses like [place] East respiratory syndrome (MERS) and severe acute respiratory syndrome (SARS). COVID-19 is caused by a novel coronavirus. That means it's a new type that has not been seen in people before. This virus spreads person-to-person through droplets from coughing and sneezing. It can also spread when you are close to someone who is infected. And it can spread when you touch something that has the virus on it, such as a doorknob or a tabletop. What can you do to protect yourself from coronavirus (COVID-19)?   The best way to protect yourself from getting sick is to:  · Avoid areas where there is an outbreak. · Avoid contact with people who may be infected. · Wash your hands often with soap or alcohol-based hand sanitizers. · Avoid crowds and try to stay at least 6 feet away from other people. · Wash your hands often, especially after you cough or sneeze. Use soap and water, and scrub for at least 20 seconds. If soap and water aren't available, use an alcohol-based hand . · Avoid touching your mouth, nose, and eyes. What can you do to avoid spreading the virus to others? To help avoid spreading the virus to others:  · Cover your mouth with a tissue when you cough or sneeze. Then throw the tissue in the trash. · Use a disinfectant to clean things that you touch often. · Wear a cloth face cover if you have to go to public areas. · Stay home if you are sick or have been exposed to the virus. Don't go to school, work, or public areas. And don't use public transportation, ride-shares, or taxis unless you have no choice. · If you are sick:  ? Leave your home only if you need to get medical care. But call the doctor's office first so they know you're coming. And wear a face cover. ? Wear the face cover whenever you're around other people. It can help stop the spread of the virus when you cough or sneeze. ? Clean and disinfect your home every day. Use household  and disinfectant wipes or sprays. Take special care to clean things that you grab with your hands. These include doorknobs, remote controls, phones, and handles on your refrigerator and microwave. And don't forget countertops, tabletops, bathrooms, and computer keyboards. When to call for help  [id number] anytime you think you may need emergency care. For example, call if:  · You have severe trouble breathing. (You can't talk at all.)  · You have constant chest pain or pressure. · You are severely dizzy or lightheaded. · You are confused or can't think clearly.   · Your face and lips have a blue color. · You pass out (lose consciousness) or are very hard to wake up. Call your doctor now if you develop symptoms such as:  · Shortness of breath. · Fever. · Cough. If you need to get care, call ahead to the doctor's office for instructions before you go. Make sure you wear a face cover to prevent exposing other people to the virus. Where can you get the latest information? The following health organizations are tracking and studying this virus. Their websites contain the most up-to-date information. Audery Bloch also learn what to do if you think you may have been exposed to the virus. · U.S. Centers for Disease Control and Prevention (CDC): The CDC provides updated news about the disease and travel advice. The website also tells you how to prevent the spread of infection. www.cdc.gov  · World Health Organization [facility]): WHO offers information about the virus outbreaks. WHO also has travel advice. www.who.int  Current as of: May 8, 2020               Content Version: 12.5  © 2006-2020 [employer]. Care instructions adapted under license by [employer] (which disclaims liability or warranty for this information). If you have questions about a medical condition or this instruction, always ask your healthcare professional. [address] any warranty or liability for your use of this information.

## 2021-04-01 NOTE — BRIEF OP NOTE
Brief Postoperative Note    Patient: Анна Delgadillo III  YOB: 1960  MRN: [id number]    Date of Procedure: 4/1/2021     Pre-Op Diagnosis: Hardware complicating wound infection[facility] Moira Parsonsdori. 7XXA]  Acquired claw toe, right [M20.5X1]    Post-Op Diagnosis: Same as preoperative diagnosis.       Procedure(s):  RIGHT THIRD , FOURTH TOE HARDWARE REMOVAL WITH  AMPUTATION OF THIRD TOE AND FOURTH FLEXOR DIGITORUM LONGUS TENOTOMY    Surgeon(s):  Raymundo Hills MD    Surgical Assistant: None    Anesthesia: Regional     Estimated Blood Loss (mL): Minimal    Complications: None    Specimens: * No specimens in log *     Implants: * No implants in log *    Drains: * No LDAs found *    Findings:     Electronically Signed by Simon Nayak MD on 4/1/2021 at 4:50 PM

## 2021-05-27 ENCOUNTER — HOSPITAL ENCOUNTER (OUTPATIENT)
Dept: LAB | Age: 61
Discharge: HOME | End: 2021-05-27

## 2021-05-27 PROCEDURE — 88305 TISSUE EXAM BY PATHOLOGIST: CPT

## 2021-05-27 PROCEDURE — 88312 SPECIAL STAINS GROUP 1: CPT

## 2021-06-16 ENCOUNTER — ANESTHESIA EVENT (OUTPATIENT)
Dept: SURGERY | Age: 61
End: 2021-06-16
Payer: COMMERCIAL

## 2021-06-17 ENCOUNTER — HOSPITAL ENCOUNTER (OUTPATIENT)
Age: 61
Setting detail: OUTPATIENT SURGERY
Discharge: HOME | End: 2021-06-17
Attending: ORTHOPAEDIC SURGERY | Admitting: ORTHOPAEDIC SURGERY
Payer: COMMERCIAL

## 2021-06-17 ENCOUNTER — ANESTHESIA (OUTPATIENT)
Dept: SURGERY | Age: 61
End: 2021-06-17
Payer: COMMERCIAL

## 2021-06-17 ENCOUNTER — APPOINTMENT (OUTPATIENT)
Dept: GENERAL RADIOLOGY | Age: 61
End: 2021-06-17
Attending: ORTHOPAEDIC SURGERY
Payer: COMMERCIAL

## 2021-06-17 VITALS
WEIGHT: 240 LBS | DIASTOLIC BLOOD PRESSURE: 79 MMHG | HEART RATE: 68 BPM | BODY MASS INDEX: 29.21 KG/M2 | TEMPERATURE: 97.2 F | RESPIRATION RATE: 16 BRPM | OXYGEN SATURATION: 96 % | SYSTOLIC BLOOD PRESSURE: 138 MMHG

## 2021-06-17 DIAGNOSIS — G89.18 ACUTE POST-OPERATIVE PAIN: ICD-10-CM

## 2021-06-17 DIAGNOSIS — M19.079 ARTHRITIS OF JOINT OF TOE: Primary | ICD-10-CM

## 2021-06-17 LAB
GLUCOSE BLD STRIP.AUTO-MCNC: 93 MG/DL (ref 65–100)
POTASSIUM BLD-SCNC: 3.4 MMOL/L (ref 3.5–5.1)
SERVICE CMNT-IMP: NORMAL

## 2021-06-17 PROCEDURE — 76060000032 HC ANESTHESIA 0.5 TO 1 HR: Performed by: ORTHOPAEDIC SURGERY

## 2021-06-17 PROCEDURE — 74011000250 HC RX REV CODE- 250: Performed by: REGISTERED NURSE

## 2021-06-17 PROCEDURE — 82962 GLUCOSE BLOOD TEST: CPT

## 2021-06-17 PROCEDURE — 77030008845 HC WRE K STRY -B: Performed by: ORTHOPAEDIC SURGERY

## 2021-06-17 PROCEDURE — 28270 RELEASE OF FOOT CONTRACTURE: CPT | Performed by: ORTHOPAEDIC SURGERY

## 2021-06-17 PROCEDURE — 74011250636 HC RX REV CODE- 250/636: Performed by: ANESTHESIOLOGY

## 2021-06-17 PROCEDURE — 77030006788 HC BLD SAW OSC STRY -B: Performed by: ORTHOPAEDIC SURGERY

## 2021-06-17 PROCEDURE — 74011250636 HC RX REV CODE- 250/636: Performed by: NURSE PRACTITIONER

## 2021-06-17 PROCEDURE — 74011250636 HC RX REV CODE- 250/636: Performed by: REGISTERED NURSE

## 2021-06-17 PROCEDURE — C1713 ANCHOR/SCREW BN/BN,TIS/BN: HCPCS | Performed by: ORTHOPAEDIC SURGERY

## 2021-06-17 PROCEDURE — 77030002933 HC SUT MCRYL J&J -A: Performed by: ORTHOPAEDIC SURGERY

## 2021-06-17 PROCEDURE — 77030002916 HC SUT ETHLN J&J -A: Performed by: ORTHOPAEDIC SURGERY

## 2021-06-17 PROCEDURE — 76010000138 HC OR TIME 0.5 TO 1 HR: Performed by: ORTHOPAEDIC SURGERY

## 2021-06-17 PROCEDURE — 84132 ASSAY OF SERUM POTASSIUM: CPT

## 2021-06-17 PROCEDURE — 76210000006 HC OR PH I REC 0.5 TO 1 HR: Performed by: ORTHOPAEDIC SURGERY

## 2021-06-17 PROCEDURE — 74011000250 HC RX REV CODE- 250: Performed by: ORTHOPAEDIC SURGERY

## 2021-06-17 PROCEDURE — 2709999900 HC NON-CHARGEABLE SUPPLY: Performed by: ORTHOPAEDIC SURGERY

## 2021-06-17 PROCEDURE — 28285 REPAIR OF HAMMERTOE: CPT | Performed by: ORTHOPAEDIC SURGERY

## 2021-06-17 PROCEDURE — 77030000032 HC CUF TRNQT ZIMM -B: Performed by: ORTHOPAEDIC SURGERY

## 2021-06-17 PROCEDURE — 76210000020 HC REC RM PH II FIRST 0.5 HR: Performed by: ORTHOPAEDIC SURGERY

## 2021-06-17 DEVICE — HEADLESS SCREW
Type: IMPLANTABLE DEVICE | Site: FOOT | Status: FUNCTIONAL
Brand: MINI

## 2021-06-17 RX ORDER — PROPOFOL 10 MG/ML
INJECTION, EMULSION INTRAVENOUS AS NEEDED
Status: DISCONTINUED | OUTPATIENT
Start: 2021-06-17 | End: 2021-06-17 | Stop reason: HOSPADM

## 2021-06-17 RX ORDER — ACETAMINOPHEN 500 MG
1000 TABLET ORAL ONCE
Status: DISCONTINUED | OUTPATIENT
Start: 2021-06-17 | End: 2021-06-17 | Stop reason: HOSPADM

## 2021-06-17 RX ORDER — SODIUM CHLORIDE, SODIUM LACTATE, POTASSIUM CHLORIDE, CALCIUM CHLORIDE 600; 310; 30; 20 MG/100ML; MG/100ML; MG/100ML; MG/100ML
1000 INJECTION, SOLUTION INTRAVENOUS CONTINUOUS
Status: DISCONTINUED | OUTPATIENT
Start: 2021-06-17 | End: 2021-06-17 | Stop reason: HOSPADM

## 2021-06-17 RX ORDER — HYDROCODONE BITARTRATE AND ACETAMINOPHEN 5; 325 MG/1; MG/1
1 TABLET ORAL
Qty: 30 TABLET | Refills: 0 | Status: SHIPPED | OUTPATIENT
Start: 2021-06-17 | End: 2021-06-24

## 2021-06-17 RX ORDER — CEFAZOLIN SODIUM/WATER 2 G/20 ML
2 SYRINGE (ML) INTRAVENOUS ONCE
Status: COMPLETED | OUTPATIENT
Start: 2021-06-17 | End: 2021-06-17

## 2021-06-17 RX ORDER — ALBUTEROL SULFATE 0.83 MG/ML
2.5 SOLUTION RESPIRATORY (INHALATION) AS NEEDED
Status: DISCONTINUED | OUTPATIENT
Start: 2021-06-17 | End: 2021-06-17 | Stop reason: HOSPADM

## 2021-06-17 RX ORDER — MIDAZOLAM HYDROCHLORIDE 1 MG/ML
2 INJECTION, SOLUTION INTRAMUSCULAR; INTRAVENOUS
Status: DISCONTINUED | OUTPATIENT
Start: 2021-06-17 | End: 2021-06-17 | Stop reason: HOSPADM

## 2021-06-17 RX ORDER — CEPHALEXIN 500 MG/1
500 CAPSULE ORAL 4 TIMES DAILY
Qty: 12 CAPSULE | Refills: 0 | Status: SHIPPED | OUTPATIENT
Start: 2021-06-17

## 2021-06-17 RX ORDER — ONDANSETRON 2 MG/ML
4 INJECTION INTRAMUSCULAR; INTRAVENOUS
Status: DISCONTINUED | OUTPATIENT
Start: 2021-06-17 | End: 2021-06-17 | Stop reason: HOSPADM

## 2021-06-17 RX ORDER — LIDOCAINE HYDROCHLORIDE 20 MG/ML
INJECTION, SOLUTION EPIDURAL; INFILTRATION; INTRACAUDAL; PERINEURAL AS NEEDED
Status: DISCONTINUED | OUTPATIENT
Start: 2021-06-17 | End: 2021-06-17 | Stop reason: HOSPADM

## 2021-06-17 RX ORDER — SODIUM CHLORIDE 0.9 % (FLUSH) 0.9 %
5-40 SYRINGE (ML) INJECTION EVERY 8 HOURS
Status: DISCONTINUED | OUTPATIENT
Start: 2021-06-17 | End: 2021-06-17 | Stop reason: HOSPADM

## 2021-06-17 RX ORDER — PROPOFOL 10 MG/ML
INJECTION, EMULSION INTRAVENOUS
Status: DISCONTINUED | OUTPATIENT
Start: 2021-06-17 | End: 2021-06-17 | Stop reason: HOSPADM

## 2021-06-17 RX ORDER — BUPIVACAINE HYDROCHLORIDE 5 MG/ML
INJECTION, SOLUTION EPIDURAL; INTRACAUDAL AS NEEDED
Status: DISCONTINUED | OUTPATIENT
Start: 2021-06-17 | End: 2021-06-17 | Stop reason: HOSPADM

## 2021-06-17 RX ORDER — SODIUM CHLORIDE 0.9 % (FLUSH) 0.9 %
5-40 SYRINGE (ML) INJECTION AS NEEDED
Status: DISCONTINUED | OUTPATIENT
Start: 2021-06-17 | End: 2021-06-17 | Stop reason: HOSPADM

## 2021-06-17 RX ORDER — OXYCODONE HYDROCHLORIDE 5 MG/1
5 TABLET ORAL
Status: DISCONTINUED | OUTPATIENT
Start: 2021-06-17 | End: 2021-06-17 | Stop reason: HOSPADM

## 2021-06-17 RX ORDER — HYDROMORPHONE HYDROCHLORIDE 1 MG/ML
0.5 INJECTION, SOLUTION INTRAMUSCULAR; INTRAVENOUS; SUBCUTANEOUS
Status: DISCONTINUED | OUTPATIENT
Start: 2021-06-17 | End: 2021-06-17 | Stop reason: HOSPADM

## 2021-06-17 RX ORDER — LIDOCAINE HYDROCHLORIDE 10 MG/ML
0.1 INJECTION INFILTRATION; PERINEURAL AS NEEDED
Status: DISCONTINUED | OUTPATIENT
Start: 2021-06-17 | End: 2021-06-17 | Stop reason: HOSPADM

## 2021-06-17 RX ADMIN — LIDOCAINE HYDROCHLORIDE 50 MG: 20 INJECTION, SOLUTION EPIDURAL; INFILTRATION; INTRACAUDAL; PERINEURAL at 08:03

## 2021-06-17 RX ADMIN — SODIUM CHLORIDE, SODIUM LACTATE, POTASSIUM CHLORIDE, AND CALCIUM CHLORIDE: 600; 310; 30; 20 INJECTION, SOLUTION INTRAVENOUS at 08:33

## 2021-06-17 RX ADMIN — PROPOFOL 50 MG: 10 INJECTION, EMULSION INTRAVENOUS at 08:03

## 2021-06-17 RX ADMIN — PROPOFOL 20 MG: 10 INJECTION, EMULSION INTRAVENOUS at 08:16

## 2021-06-17 RX ADMIN — PROPOFOL 30 MG: 10 INJECTION, EMULSION INTRAVENOUS at 08:05

## 2021-06-17 RX ADMIN — PROPOFOL 140 MCG/KG/MIN: 10 INJECTION, EMULSION INTRAVENOUS at 08:03

## 2021-06-17 RX ADMIN — SODIUM CHLORIDE, SODIUM LACTATE, POTASSIUM CHLORIDE, AND CALCIUM CHLORIDE 1000 ML: 600; 310; 30; 20 INJECTION, SOLUTION INTRAVENOUS at 07:18

## 2021-06-17 RX ADMIN — CEFAZOLIN 2 G: 1 INJECTION, POWDER, FOR SOLUTION INTRAVENOUS at 08:05

## 2021-06-17 NOTE — BRIEF OP NOTE
Brief Postoperative Note    Patient: Sarita Monae III  YOB: 1960  MRN: [id number]    Date of Procedure: 6/17/2021     Pre-Op Diagnosis: Acquired claw toe of right foot [M20.5X1]    Post-Op Diagnosis: Same as preoperative diagnosis. Procedure(s):  RIGHT 5TH PROXIMAL INTERPHALANGEAL JOINT RESECTION ARTHROPLASTY/ METATARSOPHALANGEAL RELEASE AND FLEXOR DIGITORUM LONGUS TENOTOMY    Surgeon(s):  Duncan Zayas MD    Surgical Assistant: Nurse Practitioner: Lyndon Barahona RN    Anesthesia: MAC     Estimated Blood Loss (mL): Minimal    Complications: None    Specimens: * No specimens in log *     Implants:   Implant Name Type Inv.  Item Serial No.  Lot No. LRB No. Used Action   SCREW BNE L20MM DIA2.5MM [employer] - [id number]  SCREW BNE L20MM DIA2.5MM [employer] HDL[employer]  [employer] [id number] 1 Implanted       Drains: * No LDAs found *    Findings:     Electronically Signed by Robert Sebastian MD on 6/17/2021 at 2:50 PM

## 2021-06-17 NOTE — DISCHARGE INSTRUCTIONS
INSTRUCTIONS FOLLOWING FOOT SURGERY    ACTIVITY  Elevate foot. No Ice    Protected partial weight bearing on the heel only as tolerated in post op shoe after full sensation returns. Let the office know if dressing gets saturated with water . Blood clot prevention:  As instructed by Dr Bryson Urbano: Take one 325mg aspirin daily if okay with your medical doctor and you have no GI ulcer. Get up and out of bed frequently. While in bed move the legs as much as possible)    DRESSING CARE Keep dry and in place until follow up appointment. Cover with cast bag or plastic bag when showering. CALL YOUR DOCTOR IF YOU HAVE  Excessive bleeding that does not stop after holding mild pressure over the area. Temperature of 101 degrees or above. Redness, excessive swelling or bruising, and/or green or yellow, smelly discharge from incision. Loss of sensation - cold, white or blue toes. DIET  Day of Surgery: Clear liquids until no nausea or vomiting; then light, bland diet (Baked chicken, plain rice, grits, scrambled egg, toast). Nothing Greasy, fried or spicy today  Advance to regular diet on second day, unless your doctor orders otherwise. PAIN  Take pain medications as directed by your doctor. Call your doctor if pain is NOT relieved by medication. PAIN MED SIDE EFFECTS  Constipation: Lots of fluids, try prune juice, then OTC stool softeners if necessary  Nausea: Take medication with food. MEDICATION INTERACTION:  During your procedure you potentially received a medication or medications which may reduce the effectiveness of oral contraceptives. Please consider other forms of contraception for 1 month following your procedure if you are currently using oral contraceptives as your primary form of birth control.  In addition to this, we recommend continuing your oral contraceptive as prescribed, unless otherwise instructed by your physician, during this time    After general anesthesia or intravenous sedation, for 24 hours or while taking prescription Narcotics:  · Limit your activities  · A responsible adult needs to be with you for the next 24 hours  · Do not drive and operate hazardous machinery  · Do not make important personal or business decisions  · Do not drink alcoholic beverages  · If you have not urinated within 8 hours after discharge, and you are experiencing discomfort from urinary retention, please go to the nearest ED. · If you have sleep apnea and have a CPAP machine, please use it for all naps and sleeping. · Please use caution when taking narcotics and any of your home medications that may cause drowsiness. *  Please give a list of your current medications to your Primary Care Provider. *  Please update this list whenever your medications are discontinued, doses are      changed, or new medications (including over-the-counter products) are added. *  Please carry medication information at all times in case of emergency situations. These are general instructions for a healthy lifestyle:  No smoking/ No tobacco products/ Avoid exposure to second hand smoke  Surgeon General's Warning:  Quitting smoking now greatly reduces serious risk to your health. Obesity, smoking, and sedentary lifestyle greatly increases your risk for illness  A healthy diet, regular physical exercise & weight monitoring are important for maintaining a healthy lifestyle    You may be retaining fluid if you have a history of heart failure or if you experience any of the following symptoms:  Weight gain of 3 pounds or more overnight or 5 pounds in a week, increased swelling in our hands or feet or shortness of breath while lying flat in bed. Please call your doctor as soon as you notice any of these symptoms; do not wait until your next office visit.

## 2021-06-17 NOTE — ANESTHESIA PREPROCEDURE EVALUATION
Relevant Problems   No relevant active problems       Anesthetic History   No history of anesthetic complications            Review of Systems / Medical History  Patient summary reviewed, nursing notes reviewed and pertinent labs reviewed    Pulmonary          Smoker (Distant h/o smoking (quit 1995))         Neuro/Psych   Within defined limits           Cardiovascular    Hypertension: well controlled          Hyperlipidemia    Exercise tolerance: >4 METS  Comments: TTE 2019- Normal EF no significant valve abnl   GI/Hepatic/Renal     GERD: well controlled           Endo/Other    Diabetes: type 2    Arthritis (rheumatoid)     Other Findings              Physical Exam    Airway  Mallampati: II    Neck ROM: normal range of motion   Mouth opening: Normal     Cardiovascular  Regular rate and rhythm,  S1 and S2 normal,  no murmur, click, rub, or gallop  Rhythm: regular  Rate: normal      Pertinent negatives: No murmur   Dental  No notable dental hx       Pulmonary  Breath sounds clear to auscultation               Abdominal         Other Findings            Anesthetic Plan    ASA: 2  Anesthesia type: total IV anesthesia - femoral single shot and popliteal fossa block          Induction: Intravenous  Anesthetic plan and risks discussed with: Patient

## 2021-06-17 NOTE — OP NOTES
FULL OP NOTE    PATIENT NAME: Ant Matta III  MRN: [id number]    DATE OF SURGERY: 6/17/2021    PREOPERATIVE DIAGNOSIS: Acquired claw toe of right foot [M20.5X1]      POSTOPERATIVE DIAGNOSIS: Acquired claw toe of right foot [M20.5X1]      PROCEDURE: 1. Right fifth proximal interphalangeal joint resection arthroplasty, 61363                             2.  Right fifth MTP capsulotomy with extensor tendon lengthening, 21337                              3.  Right fifth flexor digitorum longus tendon tenotomy, 20096    SURGEON: Tacos Carlin MD    ASSISTANT: Bk Lujan NP  An assistant was required for positioning, retraction, and wound closure for this procedure. HARDWARE:   Implant Name Type Inv. Item Serial No.  Lot No. LRB No. Used Action   SCREW BNE L20MM DIA2.5MM [employer] - [id number]  SCREW BNE L20MM DIA2.5MM [employer] HDLSS  [employer] [id number] Right 1 Implanted     INDICATIONS: This patient is a 59y.o. year old male with a history of Acquired claw toe of right foot [M20.5X1] who has failed conservative therapy and desires surgical treatment. Risks and benefits of the procedure including, but not limited to, anesthetic complications as well as surgical complications including damage to nerves and blood vessels, risk of infection, risk of incomplete pain relief, risk of malunion, nonunion and need for additional surgery have been discussed with the patient who wishes to proceed. PROCEDURE IN DETAIL: A time out was done to confirm the operating procedure, surgeon, patient and site. During a preop surgical timeout the right lower extremity was identified as the correct surgical site and then prepped and draped in standard sterile fashions and ChloraPrep solution. A field block was obtained with 0.5% plain Marcaine. A PIP resection arthroplasty of the fifth toe was performed through an elliptical incision and secured using a headless screw under fluoroscopic guidance.   A separate dorsal approach the fifth MTP joint was performed that time with extensor tendon lengthening capsulotomy. A plantar approach the fifth MTP joint was performed. Blunt dissection with a hemostat was carried down the level of flexor digitorum longus tendon. A tenotomy was then performed sharply taking care to protect both neurovascular bundles. These wounds then irrigated and closed using Monocryl nylon sutures throughout. Sterile dressings and applied. Anesthesia was discontinued. The patient was transferred back to recovery bed. He was taken recovery in satisfactory condition. He appeared to tolerate the procedure well. There were no apparent surgical or anesthetic complications. All needle and sponge counts are correct. TOURNIQUET TIME: Approx 15 minutes. SPECIMENS: none    ESTIMATED BLOOD LOSS: min mL.

## 2021-06-17 NOTE — PERIOP NOTES
Wife at bedside. Discharge instructions reviewed with patient and his wife.  They voice understanding at this time with no questions or concerns

## 2021-06-17 NOTE — ANESTHESIA POSTPROCEDURE EVALUATION
Procedure(s):  RIGHT 5TH PROXIMAL INTERPHALANGEAL JOINT RESECTION ARTHROPLASTY/ METATARSOPHALANGEAL RELEASE AND FLEXOR DIGITORUM LONGUS TENOTOMY.    total IV anesthesia    Anesthesia Post Evaluation      Multimodal analgesia: multimodal analgesia used between 6 hours prior to anesthesia start to PACU discharge  Patient location during evaluation: bedside  Patient participation: complete - patient participated  Level of consciousness: awake and responsive to light touch  Pain management: adequate  Airway patency: patent  Anesthetic complications: no  Cardiovascular status: acceptable, hemodynamically stable, blood pressure returned to baseline and stable  Respiratory status: acceptable, unassisted, spontaneous ventilation and nonlabored ventilation  Hydration status: acceptable  Post anesthesia nausea and vomiting:  controlled      INITIAL Post-op Vital signs:   Vitals Value Taken Time   /79 06/17/21 0911   Temp 36.4 °C (97.6 °F) 06/17/21 0838   Pulse 68 06/17/21 0911   Resp 16 06/17/21 0838   SpO2 96 % 06/17/21 0911

## 2021-06-17 NOTE — H&P
Outpatient Surgery History and Physical:  Candace Diaz III was seen and examined. CHIEF COMPLAINT:   Right foot. PE:     Visit Vitals  BP (!) 159/84 (BP 1 Location: Right arm, BP Patient Position: Sitting)   Pulse 73   Temp 98 °F (36.7 °C)   Resp 18   Wt 240 lb (108.9 kg)   SpO2 96%   BMI 29.21 kg/m²       Heart:   Regular rhythm      Lungs:  Are clear      Past Medical History: There are no problems to display for this patient. Surgical History:   Past Surgical History:   Procedure Laterality Date    HX COLONOSCOPY      HX LUMBAR LAMINECTOMY      HX ORTHOPAEDIC Right 2019    2cd toe hammer toe last yr    HX ORTHOPAEDIC Right 2021    HX TONSILLECTOMY  age 1       Social History: Patient  reports that he quit smoking about 25 years ago. He has a 37.50 pack-year smoking history. He has never used smokeless tobacco. He reports current alcohol use of about 4.0 standard drinks of alcohol per week. He reports previous drug use. Family History:   Family History   Problem Relation Age of Onset   Georgianna Olszewski Stroke Father     Hypertension Father        Allergies: Reviewed per EMR  No Known Allergies    Medications:    No current facility-administered medications on file prior to encounter. Current Outpatient Medications on File Prior to Encounter   Medication Sig    leflunomide (ARAVA) 20 mg tablet Take 40 mg by mouth daily.  evolocumab (REPATHA SURECLICK SC) by SubCUTAneous route every fourteen (14) days.  gabapentin (NEURONTIN) 600 mg tablet Take 1,200 mg by mouth two (2) times a day.  allopurinol (ZYLOPRIM) 100 mg tablet Take 100 mg by mouth two (2) times a day.  metFORMIN (GLUCOPHAGE) 500 mg tablet Take 500 mg by mouth two (2) times daily (with meals).  amLODIPine (NORVASC) 2.5 mg tablet Take 2.5 mg by mouth every morning.  hydrochlorothiazide (HYDRODIURIL) 25 mg tablet Take 25 mg by mouth daily.  metoprolol (LOPRESSOR) 25 mg tablet Take  by mouth two (2) times a day.     OMEPRAZOLE PO Take 20 mg by mouth every morning. The surgery is planned for the RIGHT 5TH PROXIMAL INTERPHALANGEAL JOINT RESECTION ARTHROPLASTY/ METATARSOPHALANGEAL RELEASE AND FLEXOR DIGITORUM LONGUS TENOTOMY     . History and physical has been reviewed. The patient has been examined. There have been no significant clinical changes since the completion of the originally dated History and Physical.  Patient identified by surgeon; surgical site was confirmed by patient and surgeon. The patient is here today for outpatient surgery. I have examined the patient, no changes are noted in the patient's medical status. Necessity for the procedure/care is still present and the history and physical above is current. See the office notes for the full long term history of the problem. Please see the recent office notes for the musculoskeletal examination.     Signed By: Maged Fallon NP     June 17, 2021 6:57 AM

## 2022-07-11 ENCOUNTER — OFFICE VISIT (OUTPATIENT)
Dept: ORTHOPEDIC SURGERY | Age: 62
End: 2022-07-11
Payer: COMMERCIAL

## 2022-07-11 DIAGNOSIS — M76.822 TIBIALIS TENDINITIS OF LEFT LOWER EXTREMITY: ICD-10-CM

## 2022-07-11 DIAGNOSIS — M25.572 LEFT ANKLE PAIN, UNSPECIFIED CHRONICITY: Primary | ICD-10-CM

## 2022-07-11 PROCEDURE — 20605 DRAIN/INJ JOINT/BURSA W/O US: CPT | Performed by: ORTHOPAEDIC SURGERY

## 2022-07-11 PROCEDURE — 99214 OFFICE O/P EST MOD 30 MIN: CPT | Performed by: ORTHOPAEDIC SURGERY

## 2022-07-11 NOTE — PROGRESS NOTES
Name: Ania Durham III  YOB: 1960  Gender: male  MRN: [id number]    Summary:   Left new onset posterior tibial tendinitis with sinus Tarsi syndrome       CC: Follow-up (Left ankle x-rays today)       HPI: Ania Durham III is a 57 y.o. male who presents with Follow-up (Left ankle x-rays today)  . Patient presents back to the office today with a 2-month complaint of increasing pain located in the lateral lateral aspect of his left ankle with a progressive flatfoot deformity. He is tried some rest for this with no other specific treatment. He has been concerned since he has had a Charcot reconstruction essentially done in his right foot he does not want a get to the same point on the left-hand side. History was obtained by Patient     ROS/Meds/PSH/PMH/FH/SH: I personally reviewed the patients standard intake form. Below are the pertinents    Tobacco:  reports that he quit smoking about 26 years ago. He smoked 1.50 packs per day. He has never used smokeless tobacco.  Diabetes: None      Physical Examination:  Exam of the left foot and ankle shows planovalgus hindfoot. He has limited subtalar joint range of motion with positive sinus Tarsi pain. He has palpable pulses and I believe to be intact sensation. Imaging:   I independently interpreted XR taken today  Ankle XR: AP, Lateral, Oblique views     ICD-10-CM    1. Left ankle pain, unspecified chronicity  M25.572 XR ANKLE LEFT (MIN 3 VIEWS)   2.  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 After sterile prep of the area, the Left hindfoot joint was injected with 3 cc. of Xylocaine and 3 cc. of steroid. . 40mg Depo Medrol was the steroid used. Patient tolerated it well. I discussed the risk of infection and skin blanching. I told the be patient be careful about the symptoms of hyperglycemia such as GI distress, polyuria, excessive thirst and lethargy. If these symptoms occur they should present to an primary care doctor or urgent facility  Studies ordered: NO XR needed @ Next Visit    Weight-bearing status: WBAT        Return to work/work restrictions: none  none    We discussed use of his Charcot resistant orthotic walker and then transition to a lace up ankle brace as a matter medical necessity to immobilize this in addition to the injection. He will return in 3 months.

## 2022-07-13 ENCOUNTER — TELEPHONE (OUTPATIENT)
Dept: ORTHOPEDIC SURGERY | Age: 62
End: 2022-07-13

## 2022-10-03 ENCOUNTER — OFFICE VISIT (OUTPATIENT)
Dept: ORTHOPEDIC SURGERY | Age: 62
End: 2022-10-03
Payer: COMMERCIAL

## 2022-10-03 DIAGNOSIS — M76.821 TIBIALIS TENDINITIS OF BOTH LOWER EXTREMITIES: ICD-10-CM

## 2022-10-03 DIAGNOSIS — M25.572 SINUS TARSI SYNDROME OF LEFT ANKLE: ICD-10-CM

## 2022-10-03 DIAGNOSIS — M25.571 RIGHT ANKLE PAIN, UNSPECIFIED CHRONICITY: Primary | ICD-10-CM

## 2022-10-03 DIAGNOSIS — M76.822 TIBIALIS TENDINITIS OF BOTH LOWER EXTREMITIES: ICD-10-CM

## 2022-10-03 DIAGNOSIS — M25.572 LEFT ANKLE PAIN, UNSPECIFIED CHRONICITY: ICD-10-CM

## 2022-10-03 PROCEDURE — 99214 OFFICE O/P EST MOD 30 MIN: CPT | Performed by: ORTHOPAEDIC SURGERY

## 2022-10-03 PROCEDURE — 20605 DRAIN/INJ JOINT/BURSA W/O US: CPT | Performed by: ORTHOPAEDIC SURGERY

## 2022-10-03 RX ORDER — METHYLPREDNISOLONE ACETATE 40 MG/ML
40 INJECTION, SUSPENSION INTRA-ARTICULAR; INTRALESIONAL; INTRAMUSCULAR; SOFT TISSUE ONCE
Status: COMPLETED | OUTPATIENT
Start: 2022-10-03 | End: 2022-10-03

## 2022-10-03 RX ADMIN — METHYLPREDNISOLONE ACETATE 40 MG: 40 INJECTION, SUSPENSION INTRA-ARTICULAR; INTRALESIONAL; INTRAMUSCULAR; SOFT TISSUE at 08:13

## 2022-10-03 RX ADMIN — METHYLPREDNISOLONE ACETATE 40 MG: 40 INJECTION, SUSPENSION INTRA-ARTICULAR; INTRALESIONAL; INTRAMUSCULAR; SOFT TISSUE at 08:06

## 2022-10-03 NOTE — PROGRESS NOTES
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 male who presents with Ankle Pain (Left ankle pain requesting injection today )  . This patient presents back to the office today with continued complaints of bilateral subtalar joint pain. The brace is helped somewhat as well as the previous injections. History was obtained by Patient     ROS/Meds/PSH/PMH/FH/SH: I personally reviewed the patients standard intake form. Below are the pertinents    Tobacco:  reports that he quit smoking about 26 years ago. He smoked an average of 1.5 packs per day. He has never used smokeless tobacco.  Diabetes: None      Physical Examination:    Exam of the bilateral lower extremities shows swelling and tenderness to palpation at the sinus Tarsi is a planovalgus hindfeet and limited subtalar joint range of motion bilaterally. 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 After sterile prep of the area, the Bilateral hindfoot joint was injected with 3 cc. of Xylocaine and 3 cc. of steroid. . 40mg Depo Medrol was the steroid used. Patient tolerated it well. I discussed the risk of infection and skin blanching. I told the be patient be careful about the symptoms of hyperglycemia such as GI distress, polyuria, excessive thirst and lethargy.   If these symptoms occur they should present to an primary care doctor or urgent facility  Studies ordered: NO XR needed @ Next Visit    Weight-bearing status: WBAT        Return to work/work restrictions: none  No medications given    He will continue with bracing and serial injections as needed. We discussed continue with this in an effort to avoid a triple arthrodesis procedure.

## 2022-10-17 DIAGNOSIS — M25.571 RIGHT ANKLE PAIN, UNSPECIFIED CHRONICITY: ICD-10-CM

## 2022-10-17 DIAGNOSIS — M25.572 LEFT ANKLE PAIN, UNSPECIFIED CHRONICITY: Primary | ICD-10-CM

## 2022-12-19 ENCOUNTER — OFFICE VISIT (OUTPATIENT)
Dept: ORTHOPEDIC SURGERY | Age: 62
End: 2022-12-19

## 2022-12-19 DIAGNOSIS — M20.5X1 OTHER DEFORMITIES OF TOE(S) (ACQUIRED), RIGHT FOOT: ICD-10-CM

## 2022-12-19 DIAGNOSIS — M19.079 ARTHRITIS OF SUBTALAR JOINT: ICD-10-CM

## 2022-12-19 DIAGNOSIS — M25.571 RIGHT ANKLE PAIN, UNSPECIFIED CHRONICITY: ICD-10-CM

## 2022-12-19 DIAGNOSIS — M76.822 TIBIALIS TENDINITIS OF LEFT LOWER EXTREMITY: Primary | ICD-10-CM

## 2022-12-19 DIAGNOSIS — M76.822 TIBIALIS TENDINITIS OF BOTH LOWER EXTREMITIES: Primary | ICD-10-CM

## 2022-12-19 DIAGNOSIS — M25.572 LEFT ANKLE PAIN, UNSPECIFIED CHRONICITY: ICD-10-CM

## 2022-12-19 DIAGNOSIS — M76.821 TIBIALIS TENDINITIS OF BOTH LOWER EXTREMITIES: Primary | ICD-10-CM

## 2022-12-19 NOTE — PROGRESS NOTES
Name: Vasquez Chance III  YOB: 1960  Gender: male  MRN: [id number]    Summary:   Left sinus Tarsi syndrome with stage III posterior tibial tendon insufficiency       CC: Follow-up (Blateral feet)       HPI: Vasquez Chance III is a 57 y.o. male who presents with Follow-up (Blateral feet)  . This patient presents back to the office today with continued complaints of lateral sided ankle pain and progressive planovalgus collapse of his left foot. Using a recent lace up ankle brace on this. On the right using his double upright brace after his fusion surgery and tolerating this pretty well. History was obtained by Patient     ROS/Meds/PSH/PMH/FH/SH: I personally reviewed the patients standard intake form. Below are the pertinents    Tobacco:  reports that he quit smoking about 27 years ago. He smoked an average of 1.5 packs per day. He has never used smokeless tobacco.  Diabetes: None      Physical Examination:  Exam of the left lower extremity shows a planovalgus hindfoot with sinus Tarsi syndrome and tenderness to palpation at the sinus Tarsi with limited subtalar joint range of motion. 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 After sterile prep of the area, the Left hindfoot joint was injected with 3 cc. of Xylocaine and 3 cc. of steroid. . 40mg Depo Medrol was the steroid used. Patient tolerated it well. I discussed the risk of infection and skin blanching. I told the be patient be careful about the symptoms of hyperglycemia such as GI distress, polyuria, excessive thirst and lethargy.   If these symptoms occur they should present to an primary care doctor or urgent facility  Studies ordered: NO XR needed @ Next Visit    Weight-bearing status: WBAT        Return to work/work restrictions: none  No medications given    We discussed treatment options in the office today. A decision was made to proceed with an injection of the subtalar joint which was performed today to address his increasing pain. He is also getting get a Raptor brace at a later date as a matter medical necessity. Additionally we have written for his double upright brace on the right to be changed to a different shoe in it. An order was written for this today. He will return in 3 months.

## 2022-12-29 DIAGNOSIS — M76.822 TIBIALIS TENDINITIS OF LEFT LOWER EXTREMITY: Primary | ICD-10-CM

## 2022-12-30 ENCOUNTER — OFFICE VISIT (OUTPATIENT)
Dept: ORTHOPEDIC SURGERY | Age: 62
End: 2022-12-30

## 2022-12-30 DIAGNOSIS — M76.822 TIBIALIS TENDINITIS OF LEFT LOWER EXTREMITY: ICD-10-CM

## 2022-12-30 NOTE — PROGRESS NOTES
The patient was prescribed a Wraptor brace for the patient's leftfoot. The patient wears a size 13 shoe and I fitted the patient with a XL brace. I explained how to fit the brace properly by pulling the lace tabs across top of foot first then under arch and lastly pulling the strap up firmly and attaching to the lateral Velcro strip. Thus forming a figure 8 across the ankle joint. Once the figure 8 is completed they are to secure the top (short circumferential) straps to help avoid the straps from loosening with normal wear. The patient was able to demonstrate proper fitting in office to ensure compliance with device and acknowledged satisfaction with current fit. Patient read and signed documenting they understand and agree to [facility]'s current DME return policy.

## 2023-03-27 ENCOUNTER — OFFICE VISIT (OUTPATIENT)
Dept: ORTHOPEDIC SURGERY | Age: 63
End: 2023-03-27
Payer: COMMERCIAL

## 2023-03-27 DIAGNOSIS — E11.610 CHARCOT FOOT DUE TO DIABETES MELLITUS (HCC): ICD-10-CM

## 2023-03-27 DIAGNOSIS — M19.079 ARTHRITIS OF SUBTALAR JOINT: Primary | ICD-10-CM

## 2023-03-27 PROCEDURE — 11043 DBRDMT MUSC&/FSCA 1ST 20/<: CPT | Performed by: ORTHOPAEDIC SURGERY

## 2023-03-27 PROCEDURE — 20605 DRAIN/INJ JOINT/BURSA W/O US: CPT | Performed by: ORTHOPAEDIC SURGERY

## 2023-03-27 PROCEDURE — 99214 OFFICE O/P EST MOD 30 MIN: CPT | Performed by: ORTHOPAEDIC SURGERY

## 2023-03-27 RX ORDER — METHYLPREDNISOLONE ACETATE 40 MG/ML
40 INJECTION, SUSPENSION INTRA-ARTICULAR; INTRALESIONAL; INTRAMUSCULAR; SOFT TISSUE ONCE
Status: COMPLETED | OUTPATIENT
Start: 2023-03-27 | End: 2023-03-27

## 2023-03-27 RX ADMIN — METHYLPREDNISOLONE ACETATE 40 MG: 40 INJECTION, SUSPENSION INTRA-ARTICULAR; INTRALESIONAL; INTRAMUSCULAR; SOFT TISSUE at 08:51

## 2023-03-27 NOTE — PROGRESS NOTES
the be patient be careful about the symptoms of hyperglycemia such as GI distress, polyuria, excessive thirst and lethargy. If these symptoms occur they should present to an primary care doctor or urgent facility and Minor Procedure: Foot Callus debridement. I took a 15 blade knife and identified the thick hyperkeratosis/callus. I then used used the 15 blade knife to debride, trim and pare down #2 callus.    Studies ordered: NO XR needed @ Next Visit    Weight-bearing status: WBAT        Return to work/work restrictions: none  No medications given

## 2023-05-05 ENCOUNTER — OFFICE VISIT (OUTPATIENT)
Dept: ORTHOPEDIC SURGERY | Age: 63
End: 2023-05-05

## 2023-05-05 DIAGNOSIS — M25.572 CHRONIC PAIN OF LEFT ANKLE: Primary | ICD-10-CM

## 2023-05-05 DIAGNOSIS — M19.079 ARTHRITIS OF SUBTALAR JOINT: ICD-10-CM

## 2023-05-05 DIAGNOSIS — M76.822 TIBIALIS TENDINITIS OF LEFT LOWER EXTREMITY: ICD-10-CM

## 2023-05-05 DIAGNOSIS — G89.29 CHRONIC PAIN OF LEFT ANKLE: Primary | ICD-10-CM

## 2023-05-05 DIAGNOSIS — M20.5X2 ACQUIRED CLAW TOE OF LEFT FOOT: ICD-10-CM

## 2023-05-05 NOTE — PROGRESS NOTES
Name: Kaycee Pena III  YOB: 1960  Gender: male  MRN: [id number]    Summary:   Left progressive stage III flatfoot deformity and fixed second third and fourth mallet toes       CC: Ankle Pain (Left ankle pain  x-rays taken in office today)       HPI: Kaycee Pena III is a 70 y.o. male who presents with Ankle Pain (Left ankle pain  x-rays taken in office today)  . This patient presents back to the office today with increasing complaints of pain located in his left ankle with progressive collapse. He is tried injections for this as well as bracing as well as fixed claw toe deformities. History was obtained by Patient and his wife    ROS/Meds/PSH/PMH/FH/SH: I personally reviewed the patients standard intake form. Below are the pertinents    Tobacco:  reports that he quit smoking about 27 years ago. He smoked an average of 1.5 packs per day. He has never used smokeless tobacco.  Diabetes: Diabetic - non insulin      Physical Examination:    Exam of the left lower extremity shows a progressive planovalgus deformity with swelling and tenderness palpation of the sinus Tarsi as well as over the posterior tibial tendon. The midfoot is not collapsed down. He has very limited subtalar joint range of motion. He has palpable pulses and intact sensation. There are fixed second third and fourth claw toe deformities. Imaging:   I independently interpreted XR taken today  Left ankle XR: AP, Lateral, Oblique views     ICD-10-CM    1. Chronic pain of left ankle  M25.572 XR ANKLE LEFT (MIN 3 VIEWS)    G89.29       2. Arthritis of subtalar joint  M19.079 XR ANKLE LEFT (MIN 3 VIEWS)      3. Tibialis tendinitis of left lower extremity  M76.822       4.  Acquired claw toe of left foot  M20.5X2          Report: AP, lateral, oblique x-ray of the left ankle demonstrates planovalgus collapse    Impression: Planovalgus collapse   Mansoor Avelar III, MD           Assessment:   Left stage III

## 2023-05-11 DIAGNOSIS — M19.079 ARTHRITIS OF SUBTALAR JOINT: Primary | ICD-10-CM

## 2023-05-11 DIAGNOSIS — M20.5X2 ACQUIRED CLAW TOE OF LEFT FOOT: ICD-10-CM

## 2023-05-12 RX ORDER — EVOLOCUMAB 140 MG/ML
INJECTION, SOLUTION SUBCUTANEOUS
Status: ON HOLD | COMMUNITY
End: 2023-06-01 | Stop reason: HOSPADM

## 2023-05-12 RX ORDER — RISANKIZUMAB-RZAA 150 MG/ML
INJECTION SUBCUTANEOUS
Status: ON HOLD | COMMUNITY
End: 2023-06-01 | Stop reason: HOSPADM

## 2023-05-31 ENCOUNTER — ANESTHESIA EVENT (OUTPATIENT)
Dept: SURGERY | Age: 63
End: 2023-05-31
Payer: COMMERCIAL

## 2023-05-31 RX ORDER — HALOPERIDOL 5 MG/ML
1 INJECTION INTRAMUSCULAR
Status: CANCELLED | OUTPATIENT
Start: 2023-05-31 | End: 2023-06-01

## 2023-05-31 RX ORDER — OXYCODONE HYDROCHLORIDE 5 MG/1
10 TABLET ORAL PRN
Status: CANCELLED | OUTPATIENT
Start: 2023-05-31 | End: 2023-05-31

## 2023-05-31 RX ORDER — LABETALOL HYDROCHLORIDE 5 MG/ML
10 INJECTION, SOLUTION INTRAVENOUS
Status: CANCELLED | OUTPATIENT
Start: 2023-05-31

## 2023-05-31 RX ORDER — PROCHLORPERAZINE EDISYLATE 5 MG/ML
5 INJECTION INTRAMUSCULAR; INTRAVENOUS
Status: CANCELLED | OUTPATIENT
Start: 2023-05-31 | End: 2023-06-01

## 2023-05-31 RX ORDER — HYDRALAZINE HYDROCHLORIDE 20 MG/ML
10 INJECTION INTRAMUSCULAR; INTRAVENOUS
Status: CANCELLED | OUTPATIENT
Start: 2023-05-31

## 2023-05-31 RX ORDER — OXYCODONE HYDROCHLORIDE 5 MG/1
5 TABLET ORAL PRN
Status: CANCELLED | OUTPATIENT
Start: 2023-05-31 | End: 2023-05-31

## 2023-05-31 RX ORDER — HYDROMORPHONE HYDROCHLORIDE 2 MG/ML
0.5 INJECTION, SOLUTION INTRAMUSCULAR; INTRAVENOUS; SUBCUTANEOUS EVERY 5 MIN PRN
Status: CANCELLED | OUTPATIENT
Start: 2023-05-31

## 2023-05-31 RX ORDER — IPRATROPIUM BROMIDE AND ALBUTEROL SULFATE 2.5; .5 MG/3ML; MG/3ML
1 SOLUTION RESPIRATORY (INHALATION)
Status: CANCELLED | OUTPATIENT
Start: 2023-05-31 | End: 2023-06-01

## 2023-05-31 RX ORDER — DIPHENHYDRAMINE HYDROCHLORIDE 50 MG/ML
12.5 INJECTION INTRAMUSCULAR; INTRAVENOUS
Status: CANCELLED | OUTPATIENT
Start: 2023-05-31 | End: 2023-06-01

## 2023-05-31 NOTE — PERIOP NOTE
Preop department called to notify patient of arrival time for scheduled procedure. Instructions given to   - Arrive at [address]. - Remain NPO after midnight, unless otherwise indicated, including gum, mints, and ice chips. - Have a responsible adult to drive patient to the hospital, stay during surgery, and patient will need supervision 24 hours after anesthesia. - Use antibacterial soap in shower the night before surgery and on the morning of surgery.        Was patient contacted: pt  Voicemail left:   Numbers contacted: [phone]   Arrival time: 21

## 2023-06-01 ENCOUNTER — ANESTHESIA (OUTPATIENT)
Dept: SURGERY | Age: 63
End: 2023-06-01
Payer: COMMERCIAL

## 2023-06-01 ENCOUNTER — HOSPITAL ENCOUNTER (OUTPATIENT)
Age: 63
Setting detail: OUTPATIENT SURGERY
Discharge: HOME | End: 2023-06-01
Attending: ORTHOPAEDIC SURGERY | Admitting: ORTHOPAEDIC SURGERY
Payer: COMMERCIAL

## 2023-06-01 ENCOUNTER — APPOINTMENT (OUTPATIENT)
Dept: GENERAL RADIOLOGY | Age: 63
End: 2023-06-01
Attending: ORTHOPAEDIC SURGERY
Payer: COMMERCIAL

## 2023-06-01 VITALS
RESPIRATION RATE: 20 BRPM | HEIGHT: 76 IN | HEART RATE: 68 BPM | SYSTOLIC BLOOD PRESSURE: 148 MMHG | OXYGEN SATURATION: 99 % | TEMPERATURE: 98 F | BODY MASS INDEX: 29.22 KG/M2 | DIASTOLIC BLOOD PRESSURE: 70 MMHG | WEIGHT: 240 LBS

## 2023-06-01 DIAGNOSIS — M19.079 ARTHRITIS OF SUBTALAR JOINT: ICD-10-CM

## 2023-06-01 DIAGNOSIS — M20.5X2 ACQUIRED CLAW TOE OF LEFT FOOT: ICD-10-CM

## 2023-06-01 DIAGNOSIS — Z01.818 PRE-OP TESTING: Primary | ICD-10-CM

## 2023-06-01 DIAGNOSIS — G89.18 ACUTE POST-OPERATIVE PAIN: ICD-10-CM

## 2023-06-01 LAB
GLUCOSE BLD STRIP.AUTO-MCNC: 102 MG/DL (ref 65–100)
GLUCOSE BLD STRIP.AUTO-MCNC: 80 MG/DL (ref 65–100)
POTASSIUM BLD-SCNC: 3.5 MMOL/L (ref 3.5–5.1)
SERVICE CMNT-IMP: ABNORMAL
SERVICE CMNT-IMP: NORMAL

## 2023-06-01 PROCEDURE — C1713 ANCHOR/SCREW BN/BN,TIS/BN: HCPCS | Performed by: ORTHOPAEDIC SURGERY

## 2023-06-01 PROCEDURE — C1734 ORTH/DEVIC/DRUG BN/BN,TIS/BN: HCPCS | Performed by: ORTHOPAEDIC SURGERY

## 2023-06-01 PROCEDURE — 6360000002 HC RX W HCPCS: Performed by: ANESTHESIOLOGY

## 2023-06-01 PROCEDURE — 3600000004 HC SURGERY LEVEL 4 BASE: Performed by: ORTHOPAEDIC SURGERY

## 2023-06-01 PROCEDURE — 2709999900 HC NON-CHARGEABLE SUPPLY: Performed by: ORTHOPAEDIC SURGERY

## 2023-06-01 PROCEDURE — C1769 GUIDE WIRE: HCPCS | Performed by: ORTHOPAEDIC SURGERY

## 2023-06-01 PROCEDURE — 2500000003 HC RX 250 WO HCPCS: Performed by: NURSE ANESTHETIST, CERTIFIED REGISTERED

## 2023-06-01 PROCEDURE — 82962 GLUCOSE BLOOD TEST: CPT

## 2023-06-01 PROCEDURE — 3600000014 HC SURGERY LEVEL 4 ADDTL 15MIN: Performed by: ORTHOPAEDIC SURGERY

## 2023-06-01 PROCEDURE — C1889 IMPLANT/INSERT DEVICE, NOC: HCPCS | Performed by: ORTHOPAEDIC SURGERY

## 2023-06-01 PROCEDURE — 6360000002 HC RX W HCPCS: Performed by: NURSE ANESTHETIST, CERTIFIED REGISTERED

## 2023-06-01 PROCEDURE — 7100000001 HC PACU RECOVERY - ADDTL 15 MIN: Performed by: ORTHOPAEDIC SURGERY

## 2023-06-01 PROCEDURE — 6360000002 HC RX W HCPCS: Performed by: NURSE PRACTITIONER

## 2023-06-01 PROCEDURE — 84132 ASSAY OF SERUM POTASSIUM: CPT

## 2023-06-01 PROCEDURE — 3700000001 HC ADD 15 MINUTES (ANESTHESIA): Performed by: ORTHOPAEDIC SURGERY

## 2023-06-01 PROCEDURE — 2580000003 HC RX 258: Performed by: NURSE PRACTITIONER

## 2023-06-01 PROCEDURE — 7100000010 HC PHASE II RECOVERY - FIRST 15 MIN: Performed by: ORTHOPAEDIC SURGERY

## 2023-06-01 PROCEDURE — 64447 NJX AA&/STRD FEMORAL NRV IMG: CPT | Performed by: ANESTHESIOLOGY

## 2023-06-01 PROCEDURE — 6360000002 HC RX W HCPCS: Performed by: STUDENT IN AN ORGANIZED HEALTH CARE EDUCATION/TRAINING PROGRAM

## 2023-06-01 PROCEDURE — 64445 NJX AA&/STRD SCIATIC NRV IMG: CPT | Performed by: ANESTHESIOLOGY

## 2023-06-01 PROCEDURE — 3700000000 HC ANESTHESIA ATTENDED CARE: Performed by: ORTHOPAEDIC SURGERY

## 2023-06-01 PROCEDURE — 7100000000 HC PACU RECOVERY - FIRST 15 MIN: Performed by: ORTHOPAEDIC SURGERY

## 2023-06-01 PROCEDURE — 7100000011 HC PHASE II RECOVERY - ADDTL 15 MIN: Performed by: ORTHOPAEDIC SURGERY

## 2023-06-01 DEVICE — IMPLANTABLE DEVICE
Type: IMPLANTABLE DEVICE | Site: FOOT | Status: FUNCTIONAL
Brand: EASYFUSE™

## 2023-06-01 DEVICE — HEADLESS SCREW
Type: IMPLANTABLE DEVICE | Site: FOOT | Status: FUNCTIONAL
Brand: MINI

## 2023-06-01 DEVICE — CANNULATED SCREW - 16MM THREAD
Type: IMPLANTABLE DEVICE | Site: FOOT | Status: FUNCTIONAL
Brand: AXSOS 3

## 2023-06-01 DEVICE — GRAFT BNE INJ 3 CC AUG: Type: IMPLANTABLE DEVICE | Site: FOOT | Status: FUNCTIONAL

## 2023-06-01 DEVICE — ALLOGRAFT BNE CHIP 1-4 MM 15 CC CRUSH CANC: Type: IMPLANTABLE DEVICE | Site: FOOT | Status: FUNCTIONAL

## 2023-06-01 DEVICE — NON-THREADED KWIRE
Type: IMPLANTABLE DEVICE | Site: FOOT | Status: FUNCTIONAL
Brand: MINI

## 2023-06-01 DEVICE — WIRE ORTH 1.1MM DIA 229MM SMOOTH DBL BAYNT TIP S STL K: Type: IMPLANTABLE DEVICE | Site: FOOT | Status: FUNCTIONAL

## 2023-06-01 RX ORDER — HYDROMORPHONE HYDROCHLORIDE 2 MG/1
2 TABLET ORAL EVERY 6 HOURS PRN
Qty: 20 TABLET | Refills: 0 | Status: SHIPPED | OUTPATIENT
Start: 2023-06-01 | End: 2023-06-03 | Stop reason: SDUPTHER

## 2023-06-01 RX ORDER — SODIUM CHLORIDE 0.9 % (FLUSH) 0.9 %
5-40 SYRINGE (ML) INJECTION EVERY 12 HOURS SCHEDULED
Status: DISCONTINUED | OUTPATIENT
Start: 2023-06-01 | End: 2023-06-01 | Stop reason: HOSPADM

## 2023-06-01 RX ORDER — ASPIRIN 81 MG/1
81 TABLET ORAL 2 TIMES DAILY
Qty: 30 TABLET | Refills: 0 | Status: SHIPPED | OUTPATIENT
Start: 2023-06-01

## 2023-06-01 RX ORDER — CEPHALEXIN 500 MG/1
500 CAPSULE ORAL 4 TIMES DAILY
Qty: 12 CAPSULE | Refills: 0 | Status: SHIPPED | OUTPATIENT
Start: 2023-06-01

## 2023-06-01 RX ORDER — PROPOFOL 10 MG/ML
INJECTION, EMULSION INTRAVENOUS CONTINUOUS PRN
Status: DISCONTINUED | OUTPATIENT
Start: 2023-06-01 | End: 2023-06-01 | Stop reason: SDUPTHER

## 2023-06-01 RX ORDER — SODIUM CHLORIDE 9 MG/ML
INJECTION, SOLUTION INTRAVENOUS PRN
Status: DISCONTINUED | OUTPATIENT
Start: 2023-06-01 | End: 2023-06-01 | Stop reason: HOSPADM

## 2023-06-01 RX ORDER — LIDOCAINE HYDROCHLORIDE 10 MG/ML
1 INJECTION, SOLUTION INFILTRATION; PERINEURAL
Status: DISCONTINUED | OUTPATIENT
Start: 2023-06-01 | End: 2023-06-01 | Stop reason: HOSPADM

## 2023-06-01 RX ORDER — SODIUM CHLORIDE, SODIUM LACTATE, POTASSIUM CHLORIDE, CALCIUM CHLORIDE 600; 310; 30; 20 MG/100ML; MG/100ML; MG/100ML; MG/100ML
INJECTION, SOLUTION INTRAVENOUS CONTINUOUS
Status: DISCONTINUED | OUTPATIENT
Start: 2023-06-01 | End: 2023-06-01 | Stop reason: SDUPTHER

## 2023-06-01 RX ORDER — FENTANYL CITRATE 50 UG/ML
25 INJECTION, SOLUTION INTRAMUSCULAR; INTRAVENOUS
Status: DISCONTINUED | OUTPATIENT
Start: 2023-06-01 | End: 2023-06-01 | Stop reason: HOSPADM

## 2023-06-01 RX ORDER — ROPIVACAINE HYDROCHLORIDE 5 MG/ML
INJECTION, SOLUTION EPIDURAL; INFILTRATION; PERINEURAL
Status: DISCONTINUED | OUTPATIENT
Start: 2023-06-01 | End: 2023-06-01 | Stop reason: SDUPTHER

## 2023-06-01 RX ORDER — SODIUM CHLORIDE 0.9 % (FLUSH) 0.9 %
5-40 SYRINGE (ML) INJECTION PRN
Status: DISCONTINUED | OUTPATIENT
Start: 2023-06-01 | End: 2023-06-01 | Stop reason: HOSPADM

## 2023-06-01 RX ORDER — SODIUM CHLORIDE, SODIUM LACTATE, POTASSIUM CHLORIDE, CALCIUM CHLORIDE 600; 310; 30; 20 MG/100ML; MG/100ML; MG/100ML; MG/100ML
INJECTION, SOLUTION INTRAVENOUS CONTINUOUS
Status: DISCONTINUED | OUTPATIENT
Start: 2023-06-01 | End: 2023-06-01 | Stop reason: HOSPADM

## 2023-06-01 RX ORDER — LIDOCAINE HYDROCHLORIDE 20 MG/ML
INJECTION, SOLUTION EPIDURAL; INFILTRATION; INTRACAUDAL; PERINEURAL PRN
Status: DISCONTINUED | OUTPATIENT
Start: 2023-06-01 | End: 2023-06-01 | Stop reason: SDUPTHER

## 2023-06-01 RX ORDER — MIDAZOLAM HYDROCHLORIDE 2 MG/2ML
2 INJECTION, SOLUTION INTRAMUSCULAR; INTRAVENOUS
Status: COMPLETED | OUTPATIENT
Start: 2023-06-01 | End: 2023-06-01

## 2023-06-01 RX ORDER — FENTANYL CITRATE 50 UG/ML
100 INJECTION, SOLUTION INTRAMUSCULAR; INTRAVENOUS
Status: DISCONTINUED | OUTPATIENT
Start: 2023-06-01 | End: 2023-06-01 | Stop reason: HOSPADM

## 2023-06-01 RX ORDER — PROPOFOL 10 MG/ML
INJECTION, EMULSION INTRAVENOUS PRN
Status: DISCONTINUED | OUTPATIENT
Start: 2023-06-01 | End: 2023-06-01 | Stop reason: SDUPTHER

## 2023-06-01 RX ORDER — ONDANSETRON 2 MG/ML
INJECTION INTRAMUSCULAR; INTRAVENOUS PRN
Status: DISCONTINUED | OUTPATIENT
Start: 2023-06-01 | End: 2023-06-01 | Stop reason: SDUPTHER

## 2023-06-01 RX ADMIN — LIDOCAINE HYDROCHLORIDE 60 MG: 20 INJECTION, SOLUTION EPIDURAL; INFILTRATION; INTRACAUDAL; PERINEURAL at 12:11

## 2023-06-01 RX ADMIN — PROPOFOL 120 MCG/KG/MIN: 10 INJECTION, EMULSION INTRAVENOUS at 12:11

## 2023-06-01 RX ADMIN — MIDAZOLAM 2 MG: 1 INJECTION INTRAMUSCULAR; INTRAVENOUS at 11:31

## 2023-06-01 RX ADMIN — Medication 2000 MG: at 12:13

## 2023-06-01 RX ADMIN — ROPIVACAINE HYDROCHLORIDE 10 ML: 5 INJECTION, SOLUTION EPIDURAL; INFILTRATION; PERINEURAL at 11:34

## 2023-06-01 RX ADMIN — ONDANSETRON 4 MG: 2 INJECTION INTRAMUSCULAR; INTRAVENOUS at 12:13

## 2023-06-01 RX ADMIN — MEPIVACAINE HYDROCHLORIDE 10 ML: 15 INJECTION, SOLUTION EPIDURAL; INFILTRATION at 11:29

## 2023-06-01 RX ADMIN — ROPIVACAINE HYDROCHLORIDE 20 ML: 5 INJECTION, SOLUTION EPIDURAL; INFILTRATION; PERINEURAL at 11:29

## 2023-06-01 RX ADMIN — SODIUM CHLORIDE, POTASSIUM CHLORIDE, SODIUM LACTATE AND CALCIUM CHLORIDE: 600; 310; 30; 20 INJECTION, SOLUTION INTRAVENOUS at 11:29

## 2023-06-01 RX ADMIN — PROPOFOL 40 MG: 10 INJECTION, EMULSION INTRAVENOUS at 12:11

## 2023-06-01 ASSESSMENT — PAIN SCALES - GENERAL
PAINLEVEL_OUTOF10: 0

## 2023-06-01 ASSESSMENT — PAIN - FUNCTIONAL ASSESSMENT: PAIN_FUNCTIONAL_ASSESSMENT: 0-10

## 2023-06-01 ASSESSMENT — LIFESTYLE VARIABLES: SMOKING_STATUS: 0

## 2023-06-01 NOTE — ANESTHESIA PROCEDURE NOTES
Peripheral Block    Patient location during procedure: pre-op  Reason for block: post-op pain management and at surgeon's request  Start time: 6/1/2023 11:34 AM  End time: 6/1/2023 11:37 AM  Staffing  Performed: anesthesiologist   Anesthesiologist: Vivek Stout MD  Preanesthetic Checklist  Completed: patient identified, IV checked, site marked, risks and benefits discussed, surgical/procedural consents, equipment checked, pre-op evaluation, timeout performed, anesthesia consent given, oxygen available and monitors applied/VS acknowledged  Peripheral Block   Patient position: supine  Prep: ChloraPrep  Provider prep: mask and sterile gloves  Patient monitoring: cardiac monitor, continuous pulse ox, frequent blood pressure checks, IV access, oxygen and responsive to questions  Block type: Femoral  Adductor canal  Laterality: left  Injection technique: single-shot  Guidance: ultrasound guided  Local infiltration: lidocaine  Infiltration strength: 1 %  Local infiltration: lidocaine  Dose: 1 mL    Needle   Needle type: insulated echogenic nerve stimulator needle   Needle gauge: 21 G  Needle localization: anatomical landmarks and ultrasound guidance  Needle length: 8 cm  Assessment   Injection assessment: negative aspiration for heme, no paresthesia on injection, local visualized surrounding nerve on ultrasound and no intravascular symptoms  Hemodynamics: stable  Real-time US image taken/store: yes  Outcomes: uncomplicated    Additional Notes  Time out at 1134    Local anesthetic was visualized surrounding the nerve/block plane under real time ultrasound guidance. An image was obtained and placed on the chart. The relevant block area was scanned before, during, and after the local anesthetic injection and no gross abnormalities were observed.    Medications Administered  ropivacaine (NAROPIN) injection 0.5% - Perineural   10 mL - 6/1/2023 11:34:00 AM  mepivacaine (CARBOCAINE) injection 1.5% - Perineural   10 mL -

## 2023-06-01 NOTE — ANESTHESIA POSTPROCEDURE EVALUATION
Department of Anesthesiology  Postprocedure Note    Patient: Ghassan Pugh III  MRN: [id number]  YOB: 1960  Date of evaluation: 6/1/2023      Procedure Summary     Date: 06/01/23 Room / Location: [facility] OP OR 01 / [facility]    Anesthesia Start: 1206 Anesthesia Stop: 1465    Procedures:       left triple arthrodesis (Left: Foot)      ACHILLES TENDON LENGTHENING REPAIR (Left: Foot)      left second, third and fourth distal interphalangeal joint resection arthroplasties CPT code 18638 x 3 (Left: Foot) Diagnosis:       Arthritis of subtalar joint      Acquired claw toe of left foot      (Arthritis of subtalar joint [M19.079])      (Acquired claw toe of left foot [M20.5X2])    Surgeons: Melany Whelan MD Responsible Provider: Radha Plaza MD    Anesthesia Type: TIVA ASA Status: 3          Anesthesia Type: No value filed.     Farheen Phase I: Farheen Score: 10    Farheen Phase II:        Anesthesia Post Evaluation    Patient location during evaluation: PACU  Patient participation: complete - patient participated  Level of consciousness: awake and alert  Airway patency: patent  Nausea & Vomiting: no nausea and no vomiting  Complications: no  Cardiovascular status: hemodynamically stable  Respiratory status: acceptable, nonlabored ventilation and spontaneous ventilation  Hydration status: euvolemic  Comments: BP (!) 157/88   Pulse 68   Temp 97.3 °F (36.3 °C) (Temporal)   Resp 20   Ht 6' 4\" (1.93 m)   Wt 240 lb (108.9 kg)   SpO2 99%   BMI 29.21 kg/m²     Multimodal analgesia pain management approach

## 2023-06-01 NOTE — ANESTHESIA PRE PROCEDURE
Department of Anesthesiology  Preprocedure Note       Name:  Delight Apley III   Age:  70 y.o.  :  1960                                          MRN:  [id number]         Date:  2023      Surgeon: Kirby Benavides):  Mityz Rincon MD    Procedure: Procedure(s):  left triple arthrodesis  ACHILLES TENDON LENGTHENING REPAIR  left second, third and fourth distal interphalangeal joint resection arthroplasties CPT code 93199 x 3    Medications prior to admission:   Prior to Admission medications    Medication Sig Start Date End Date Taking? Authorizing Provider   Evolocumab (REPATHA) SOSY syringe Inject into the skin every 14 days Takes on    Yes Historical Provider, MD   Risankizumab-rzaa [facility]) 150 MG/ML SOSY Inject into the skin Once every 6 weeks   Yes Historical Provider, MD   diclofenac sodium (VOLTAREN) 1 % GEL Apply 4 g topically 4 times daily  Patient not taking: Reported on 2023 10/17/22   Gustavo Patricio III, MD   OMEPRAZOLE PO Take 20 mg by mouth daily    Ar Automatic Reconciliation   allopurinol (ZYLOPRIM) 100 MG tablet Take 1 tablet by mouth 2 times daily    Ar Automatic Reconciliation   amLODIPine (NORVASC) 2.5 MG tablet Take 1 tablet by mouth daily    Ar Automatic Reconciliation   gabapentin (NEURONTIN) 600 MG tablet Take 2 tablets by mouth 2 times daily.     Ar Automatic Reconciliation   hydroCHLOROthiazide (HYDRODIURIL) 25 MG tablet Take 1 tablet by mouth in the morning and at bedtime    Ar Automatic Reconciliation   Icosapent Ethyl (VASCEPA) 1 g CAPS capsule Take 1 g by mouth 2 times daily Takes 2 caps in am and 2 caps in pm 21   Ar Automatic Reconciliation   metFORMIN (GLUCOPHAGE) 500 MG tablet Take 1 tablet by mouth 2 times daily (with meals)    Ar Automatic Reconciliation   metoprolol tartrate (LOPRESSOR) 25 MG tablet Take by mouth 2 times daily Takes 1/2 tab BID    Ar Automatic Reconciliation       Current medications:    Current Facility-Administered Medications

## 2023-06-01 NOTE — H&P
Outpatient Surgery History and Physical:  Patricia Olivas III was seen and examined. CHIEF COMPLAINT:   left foot. PE:   Ht 6' 4\" (1.93 m)   Wt 240 lb (108.9 kg)   BMI 29.21 kg/m²     Heart:   Regular rhythm      Lungs:  Are clear      Past Medical History:    Past Medical History:   Diagnosis Date    Chronic pain     hands and feet    COVID-19 vaccine series completed 04/14/2021    Moderna: 2nd dose 4/14/21- bring vaccination card DOS    Diabetes ([facility] [address]     oral medication, does not check BS, denies s.s. of hypoglycemia,  does not know last A1C    GERD (gastroesophageal reflux disease)     omeprazole, well controlled    History of basal cell cancer     Hypercholesteremia     managed with med    Hypertension     medication    RA (rheumatoid arthritis) ([address]     managed with med       Surgical History:   Past Surgical History:   Procedure Laterality Date    COLONOSCOPY      ENDOSCOPY, COLON, DIAGNOSTIC      LUMBAR LAMINECTOMY      ORTHOPEDIC SURGERY Right 2021    ORTHOPEDIC SURGERY Right 2019    2cd toe hammer toe last yr    TONSILLECTOMY  age 1       Social History: Patient  reports that he quit smoking about 27 years ago. His smoking use included cigarettes. He smoked an average of 1.5 packs per day. He has never used smokeless tobacco. He reports current alcohol use of about 4.0 standard drinks per week. He reports that he does not currently use drugs. Family History:   Family History   Problem Relation Age of Onset    Hypertension Father     Stroke Father        Allergies: Reviewed per EMR  Fenofibrate micronized and Statins    Medications:    Prior to Admission medications    Medication Sig Start Date End Date Taking?  Authorizing Provider   Evolocumab (REPATHA) SOSY syringe Inject into the skin every 14 days Takes on Thursdays   Yes Historical Provider, MD   Risankizumab-rzaa [facility]) 150 MG/ML SOSY Inject into the skin Once every 6 weeks   Yes Historical Provider, MD   diclofenac sodium

## 2023-06-01 NOTE — ANESTHESIA PROCEDURE NOTES
Peripheral Block    Patient location during procedure: holding area  Reason for block: post-op pain management and at surgeon's request  Start time: 6/1/2023 11:29 AM  End time: 6/1/2023 11:35 AM  Staffing  Performed: anesthesiologist   Anesthesiologist: Leonor Boles MD  Preanesthetic Checklist  Completed: patient identified, IV checked, site marked, risks and benefits discussed, surgical/procedural consents, equipment checked, pre-op evaluation, timeout performed, anesthesia consent given, oxygen available and monitors applied/VS acknowledged  Peripheral Block   Prep: ChloraPrep  Provider prep: mask and sterile gloves  Patient monitoring: cardiac monitor, continuous pulse ox, continuous capnometry, frequent blood pressure checks, IV access, oxygen and responsive to questions  Block type: Sciatic  Popliteal  Laterality: left  Injection technique: single-shot  Guidance: nerve stimulator and ultrasound guided    Needle   Needle type: insulated echogenic nerve stimulator needle   Needle gauge: 21 G  Needle localization: anatomical landmarks, ultrasound guidance and nerve stimulator  Needle length: 10 cm  Assessment   Injection assessment: negative aspiration for heme, no paresthesia on injection, local visualized surrounding nerve on ultrasound and no intravascular symptoms  Slow fractionated injection: yes  Hemodynamics: stable  Real-time US image taken/store: yes  Outcomes: uncomplicated    Additional Notes  Time out at 1129    20 mL 0.5% ropivacaine and 10 mL 1.5% mepivacaine injected in incremental doses of 5 mL    Local anesthetic was visualized surrounding the nerve/block plane under real time ultrasound guidance. An image was obtained and placed on the chart. The relevant block area was scanned before, during, and after the local anesthetic injection and no gross abnormalities were observed.    Medications Administered  mepivacaine (CARBOCAINE) injection 1.5% - Perineural   10 mL - 6/1/2023 11:29:00

## 2023-06-02 ENCOUNTER — TELEPHONE (OUTPATIENT)
Dept: ORTHOPEDIC SURGERY | Age: 63
End: 2023-06-02

## 2023-06-02 NOTE — TELEPHONE ENCOUNTER
Spoke to patient and informed him if he is in that much pain he  will need to go to the ED. Patient states he isn't in that much pain and he is due for his next dose of Dilaudid in 30 minutes. I also informed patient if he has any issues over the weekend he can call the office as someone is always on call. Patient voiced understanding.

## 2023-06-02 NOTE — TELEPHONE ENCOUNTER
He had surgery yesterday and is on Dilaudid but they aren't helping his pain. He has been taking 2 at a time.   He is wondering what he can do

## 2023-06-03 ENCOUNTER — TELEPHONE (OUTPATIENT)
Dept: ORTHOPEDIC SURGERY | Age: 63
End: 2023-06-03

## 2023-06-03 DIAGNOSIS — G89.18 ACUTE POST-OPERATIVE PAIN: ICD-10-CM

## 2023-06-03 DIAGNOSIS — Z09 SURGERY FOLLOW-UP: Primary | ICD-10-CM

## 2023-06-03 RX ORDER — NALOXONE HYDROCHLORIDE 2 MG/.4ML
2 INJECTION, SOLUTION INTRAMUSCULAR; SUBCUTANEOUS
Qty: 0.4 ML | Refills: 1 | Status: SHIPPED | OUTPATIENT
Start: 2023-06-03 | End: 2023-06-03

## 2023-06-03 RX ORDER — HYDROMORPHONE HYDROCHLORIDE 2 MG/1
4 TABLET ORAL EVERY 6 HOURS PRN
Qty: 20 TABLET | Refills: 0 | Status: SHIPPED | OUTPATIENT
Start: 2023-06-03 | End: 2023-06-03

## 2023-06-03 RX ORDER — HYDROMORPHONE HYDROCHLORIDE 2 MG/1
2 TABLET ORAL EVERY 6 HOURS PRN
Qty: 20 TABLET | Refills: 0 | Status: SHIPPED | OUTPATIENT
Start: 2023-06-03 | End: 2023-06-08

## 2023-06-03 NOTE — PROGRESS NOTES
Documented calls yesterday with increased pain and due to increased frequency of meds needed a refill. Also sent narcan.  PDMP reviewed

## 2023-07-03 ENCOUNTER — OFFICE VISIT (OUTPATIENT)
Dept: ORTHOPEDIC SURGERY | Age: 63
End: 2023-07-03
Payer: COMMERCIAL

## 2023-07-03 DIAGNOSIS — M19.079 ARTHRITIS OF SUBTALAR JOINT: ICD-10-CM

## 2023-07-03 DIAGNOSIS — M79.672 LEFT FOOT PAIN: Primary | ICD-10-CM

## 2023-07-03 DIAGNOSIS — M20.42 HAMMER TOE OF LEFT FOOT: ICD-10-CM

## 2023-07-03 PROCEDURE — 99024 POSTOP FOLLOW-UP VISIT: CPT | Performed by: NURSE PRACTITIONER

## 2023-07-03 PROCEDURE — L4361 PNEUMA/VAC WALK BOOT PRE OTS: HCPCS | Performed by: NURSE PRACTITIONER

## 2023-07-03 RX ORDER — SULFAMETHOXAZOLE AND TRIMETHOPRIM 800; 160 MG/1; MG/1
1 TABLET ORAL 2 TIMES DAILY
Qty: 14 TABLET | Refills: 0 | Status: SHIPPED | OUTPATIENT
Start: 2023-07-03 | End: 2023-07-05

## 2023-07-03 NOTE — PROGRESS NOTES
Name: Yaya Walls III  YOB: 1960  Gender: male  MRN: [id number]    Procedure Performed:left triple arthrodesis - Left, Achilles Tendon Lengthening Repair - Left, and left second, third and fourth distal interphalangeal joint resection arthroplasties CPT code 04963 x 3 - Left        Date of Procedure: 6/1/2023      Subjective: Worried after a fall we feels like he injured the second toe. Physical Examination: The cast was removed today. The remainder of his incisions are intact with exception of the second toe. The second toe elliptical incision has a small superficial dehiscence but the deep tissues are intact. There is no sign of infection noted. There is some floppiness and instability at the PIP resection arthroplasty of the second toe. The third and fourth remain intact however. Imaging:   I independently interpreted XR taken today  Left foot XR: AP, Lateral, Oblique views     ICD-10-CM    1. Left foot pain  M79.672 XR FOOT LEFT (MIN 3 VIEWS)     Ambulatory Referral to DME      2. Arthritis of subtalar joint  M19.079       3. Hammer toe of left foot  M20.42          Report: AP, lateral, oblique x-ray of the left foot demonstrates a broken second PIP resection arthroplasty screw. The remainder of the hardware is intact. Impression: Broken second PIP resection arthroplasty screw. The remainder of the hardware is intact. Mars Veras III, MD           Assessment:   Left second toe hammertoe correction failure with screw breakage secondary to fall      Plan:   4 This is a chronic illness/condition with exacerbation and progression  Treatment at this time: Elective major surgery with procedural risk factors  Studies ordered: NO XR needed @ Next Visit    Weight-bearing status: WBAT        Return to work/work restrictions: none  Keflex 500mg p.o. QID x 7 days. This patient does not have a PCN allergy. However I discussed itching, hives, and difficulty breathing.

## 2023-07-03 NOTE — PROGRESS NOTES
The patient was prescribed a walker boot for the patient's left foot. The patient wears a size NA shoe and I fitted them with a XL size boot. The patient was fitted and instructed on the use of prescribed walker boot. I explained how to fit themselves and that the plastic flexible piece should always be on the front of the boot and secured by the Velcro straps on top. The air bladder in the boot was adjusted according to proper fit and comfort. The patient walked a short distance and acknowledged satisfaction with current fit. I also explained that they need a heel lift or a higher heeled shoe for the uninvolved LE to help normalize gait and avoid excessive low back stress/strain due to leg length inequality created from walker boot. Patient read and signed documenting they understand and agree to [facility]'s current DME return policy.

## 2023-07-05 DIAGNOSIS — M20.42 HAMMER TOE OF LEFT FOOT: Primary | ICD-10-CM

## 2023-07-05 RX ORDER — RISANKIZUMAB-RZAA 150 MG/ML
INJECTION SUBCUTANEOUS
COMMUNITY
Start: 2023-06-14

## 2023-07-05 RX ORDER — POTASSIUM CHLORIDE 20 MEQ/1
20 TABLET, EXTENDED RELEASE ORAL DAILY
COMMUNITY
Start: 2022-09-22 | End: 2023-07-05

## 2023-07-05 RX ORDER — ERGOCALCIFEROL 1.25 MG/1
50000 CAPSULE ORAL WEEKLY
COMMUNITY
Start: 2023-04-17

## 2023-07-05 RX ORDER — CHOLESTYRAMINE 4 G/9G
POWDER, FOR SUSPENSION ORAL
COMMUNITY
Start: 2022-10-28 | End: 2023-07-05

## 2023-07-05 RX ORDER — OMEPRAZOLE 20 MG/1
20 CAPSULE, DELAYED RELEASE ORAL DAILY
COMMUNITY
Start: 2023-04-30

## 2023-07-05 RX ORDER — EVOLOCUMAB 140 MG/ML
INJECTION, SOLUTION SUBCUTANEOUS
COMMUNITY
Start: 2023-05-30

## 2023-07-05 RX ORDER — GUSELKUMAB 100 MG/ML
1 INJECTION SUBCUTANEOUS
COMMUNITY
End: 2023-07-05

## 2023-07-05 RX ORDER — CYCLOBENZAPRINE HCL 10 MG
10 TABLET ORAL 3 TIMES DAILY PRN
COMMUNITY
Start: 2021-09-09

## 2023-07-12 ENCOUNTER — ANESTHESIA EVENT (OUTPATIENT)
Dept: SURGERY | Age: 63
End: 2023-07-12
Payer: COMMERCIAL

## 2023-07-12 NOTE — PERIOP NOTE
Preop department called to notify patient of arrival time for scheduled procedure. Instructions given to   - Arrive at [address] [facility]. - Remain NPO after midnight, unless otherwise indicated, including gum, mints, and ice chips. - Have a responsible adult to drive patient to the hospital, stay during surgery, and patient will need supervision 24 hours after anesthesia. - Use antibacterial soap in shower the night before surgery and on the morning of surgery.        Was patient contacted: pt  Voicemail left:   Numbers contacted: [phone]   Arrival time: 4702

## 2023-07-13 ENCOUNTER — ANESTHESIA (OUTPATIENT)
Dept: SURGERY | Age: 63
End: 2023-07-13
Payer: COMMERCIAL

## 2023-07-13 ENCOUNTER — APPOINTMENT (OUTPATIENT)
Dept: GENERAL RADIOLOGY | Age: 63
End: 2023-07-13
Attending: ORTHOPAEDIC SURGERY
Payer: COMMERCIAL

## 2023-07-13 ENCOUNTER — HOSPITAL ENCOUNTER (OUTPATIENT)
Age: 63
Setting detail: OUTPATIENT SURGERY
Discharge: HOME | End: 2023-07-13
Attending: ORTHOPAEDIC SURGERY | Admitting: ORTHOPAEDIC SURGERY
Payer: COMMERCIAL

## 2023-07-13 VITALS
HEART RATE: 66 BPM | RESPIRATION RATE: 14 BRPM | TEMPERATURE: 97.4 F | SYSTOLIC BLOOD PRESSURE: 155 MMHG | HEIGHT: 76 IN | WEIGHT: 240 LBS | DIASTOLIC BLOOD PRESSURE: 86 MMHG | OXYGEN SATURATION: 99 % | BODY MASS INDEX: 29.22 KG/M2

## 2023-07-13 DIAGNOSIS — G89.18 ACUTE POST-OPERATIVE PAIN: Primary | ICD-10-CM

## 2023-07-13 DIAGNOSIS — M20.42 HAMMERTOE OF LEFT FOOT: ICD-10-CM

## 2023-07-13 LAB
GLUCOSE BLD STRIP.AUTO-MCNC: 91 MG/DL (ref 65–100)
POTASSIUM BLD-SCNC: 3.7 MMOL/L (ref 3.5–5.1)
SERVICE CMNT-IMP: NORMAL

## 2023-07-13 PROCEDURE — 6360000002 HC RX W HCPCS: Performed by: NURSE ANESTHETIST, CERTIFIED REGISTERED

## 2023-07-13 PROCEDURE — 3600000014 HC SURGERY LEVEL 4 ADDTL 15MIN: Performed by: ORTHOPAEDIC SURGERY

## 2023-07-13 PROCEDURE — 3700000001 HC ADD 15 MINUTES (ANESTHESIA): Performed by: ORTHOPAEDIC SURGERY

## 2023-07-13 PROCEDURE — C1713 ANCHOR/SCREW BN/BN,TIS/BN: HCPCS | Performed by: ORTHOPAEDIC SURGERY

## 2023-07-13 PROCEDURE — 84132 ASSAY OF SERUM POTASSIUM: CPT

## 2023-07-13 PROCEDURE — 6360000002 HC RX W HCPCS: Performed by: NURSE PRACTITIONER

## 2023-07-13 PROCEDURE — 7100000000 HC PACU RECOVERY - FIRST 15 MIN: Performed by: ORTHOPAEDIC SURGERY

## 2023-07-13 PROCEDURE — 7100000010 HC PHASE II RECOVERY - FIRST 15 MIN: Performed by: ORTHOPAEDIC SURGERY

## 2023-07-13 PROCEDURE — 82962 GLUCOSE BLOOD TEST: CPT

## 2023-07-13 PROCEDURE — 3600000004 HC SURGERY LEVEL 4 BASE: Performed by: ORTHOPAEDIC SURGERY

## 2023-07-13 PROCEDURE — 3700000000 HC ANESTHESIA ATTENDED CARE: Performed by: ORTHOPAEDIC SURGERY

## 2023-07-13 PROCEDURE — 6370000000 HC RX 637 (ALT 250 FOR IP): Performed by: ANESTHESIOLOGY

## 2023-07-13 PROCEDURE — 2580000003 HC RX 258: Performed by: ANESTHESIOLOGY

## 2023-07-13 PROCEDURE — 2500000003 HC RX 250 WO HCPCS: Performed by: NURSE ANESTHETIST, CERTIFIED REGISTERED

## 2023-07-13 PROCEDURE — 2709999900 HC NON-CHARGEABLE SUPPLY: Performed by: ORTHOPAEDIC SURGERY

## 2023-07-13 PROCEDURE — 7100000001 HC PACU RECOVERY - ADDTL 15 MIN: Performed by: ORTHOPAEDIC SURGERY

## 2023-07-13 PROCEDURE — 6360000002 HC RX W HCPCS: Performed by: ORTHOPAEDIC SURGERY

## 2023-07-13 DEVICE — WIRE ORTH 1.1MM DIA 229MM SMOOTH DBL BAYNT TIP S STL K: Type: IMPLANTABLE DEVICE | Site: TOES | Status: FUNCTIONAL

## 2023-07-13 RX ORDER — SODIUM CHLORIDE 9 MG/ML
INJECTION, SOLUTION INTRAVENOUS PRN
Status: DISCONTINUED | OUTPATIENT
Start: 2023-07-13 | End: 2023-07-13 | Stop reason: HOSPADM

## 2023-07-13 RX ORDER — PROCHLORPERAZINE EDISYLATE 5 MG/ML
5 INJECTION INTRAMUSCULAR; INTRAVENOUS
Status: DISCONTINUED | OUTPATIENT
Start: 2023-07-13 | End: 2023-07-13 | Stop reason: HOSPADM

## 2023-07-13 RX ORDER — SODIUM CHLORIDE, SODIUM LACTATE, POTASSIUM CHLORIDE, CALCIUM CHLORIDE 600; 310; 30; 20 MG/100ML; MG/100ML; MG/100ML; MG/100ML
INJECTION, SOLUTION INTRAVENOUS CONTINUOUS
Status: DISCONTINUED | OUTPATIENT
Start: 2023-07-13 | End: 2023-07-13 | Stop reason: HOSPADM

## 2023-07-13 RX ORDER — PROPOFOL 10 MG/ML
INJECTION, EMULSION INTRAVENOUS CONTINUOUS PRN
Status: DISCONTINUED | OUTPATIENT
Start: 2023-07-13 | End: 2023-07-13 | Stop reason: SDUPTHER

## 2023-07-13 RX ORDER — SODIUM CHLORIDE 0.9 % (FLUSH) 0.9 %
5-40 SYRINGE (ML) INJECTION PRN
Status: DISCONTINUED | OUTPATIENT
Start: 2023-07-13 | End: 2023-07-13 | Stop reason: HOSPADM

## 2023-07-13 RX ORDER — FENTANYL CITRATE 50 UG/ML
100 INJECTION, SOLUTION INTRAMUSCULAR; INTRAVENOUS
Status: DISCONTINUED | OUTPATIENT
Start: 2023-07-13 | End: 2023-07-13 | Stop reason: HOSPADM

## 2023-07-13 RX ORDER — HALOPERIDOL 5 MG/ML
1 INJECTION INTRAMUSCULAR
Status: DISCONTINUED | OUTPATIENT
Start: 2023-07-13 | End: 2023-07-13 | Stop reason: HOSPADM

## 2023-07-13 RX ORDER — SODIUM CHLORIDE 0.9 % (FLUSH) 0.9 %
5-40 SYRINGE (ML) INJECTION EVERY 12 HOURS SCHEDULED
Status: DISCONTINUED | OUTPATIENT
Start: 2023-07-13 | End: 2023-07-13 | Stop reason: HOSPADM

## 2023-07-13 RX ORDER — OXYCODONE HYDROCHLORIDE 5 MG/1
5 TABLET ORAL EVERY 6 HOURS PRN
Qty: 20 TABLET | Refills: 0 | Status: SHIPPED | OUTPATIENT
Start: 2023-07-13 | End: 2023-07-18

## 2023-07-13 RX ORDER — BUPIVACAINE HYDROCHLORIDE 5 MG/ML
INJECTION, SOLUTION EPIDURAL; INTRACAUDAL PRN
Status: DISCONTINUED | OUTPATIENT
Start: 2023-07-13 | End: 2023-07-13 | Stop reason: ALTCHOICE

## 2023-07-13 RX ORDER — OXYCODONE HYDROCHLORIDE 5 MG/1
5 TABLET ORAL
Status: DISCONTINUED | OUTPATIENT
Start: 2023-07-13 | End: 2023-07-13 | Stop reason: HOSPADM

## 2023-07-13 RX ORDER — LIDOCAINE HYDROCHLORIDE 20 MG/ML
INJECTION, SOLUTION EPIDURAL; INFILTRATION; INTRACAUDAL; PERINEURAL PRN
Status: DISCONTINUED | OUTPATIENT
Start: 2023-07-13 | End: 2023-07-13 | Stop reason: SDUPTHER

## 2023-07-13 RX ORDER — MIDAZOLAM HYDROCHLORIDE 2 MG/2ML
2 INJECTION, SOLUTION INTRAMUSCULAR; INTRAVENOUS
Status: DISCONTINUED | OUTPATIENT
Start: 2023-07-13 | End: 2023-07-13 | Stop reason: HOSPADM

## 2023-07-13 RX ORDER — ACETAMINOPHEN 500 MG
1000 TABLET ORAL ONCE
Status: COMPLETED | OUTPATIENT
Start: 2023-07-13 | End: 2023-07-13

## 2023-07-13 RX ORDER — HYDROMORPHONE HYDROCHLORIDE 2 MG/ML
0.5 INJECTION, SOLUTION INTRAMUSCULAR; INTRAVENOUS; SUBCUTANEOUS EVERY 5 MIN PRN
Status: DISCONTINUED | OUTPATIENT
Start: 2023-07-13 | End: 2023-07-13 | Stop reason: HOSPADM

## 2023-07-13 RX ORDER — LIDOCAINE HYDROCHLORIDE 10 MG/ML
1 INJECTION, SOLUTION INFILTRATION; PERINEURAL
Status: DISCONTINUED | OUTPATIENT
Start: 2023-07-13 | End: 2023-07-13 | Stop reason: HOSPADM

## 2023-07-13 RX ORDER — CEPHALEXIN 500 MG/1
500 CAPSULE ORAL 4 TIMES DAILY
Qty: 12 CAPSULE | Refills: 0 | Status: SHIPPED | OUTPATIENT
Start: 2023-07-13

## 2023-07-13 RX ORDER — PROPOFOL 10 MG/ML
INJECTION, EMULSION INTRAVENOUS PRN
Status: DISCONTINUED | OUTPATIENT
Start: 2023-07-13 | End: 2023-07-13 | Stop reason: SDUPTHER

## 2023-07-13 RX ADMIN — PROPOFOL 30 MG: 10 INJECTION, EMULSION INTRAVENOUS at 13:58

## 2023-07-13 RX ADMIN — SODIUM CHLORIDE, POTASSIUM CHLORIDE, SODIUM LACTATE AND CALCIUM CHLORIDE: 600; 310; 30; 20 INJECTION, SOLUTION INTRAVENOUS at 10:21

## 2023-07-13 RX ADMIN — PROPOFOL 20 MG: 10 INJECTION, EMULSION INTRAVENOUS at 14:01

## 2023-07-13 RX ADMIN — ACETAMINOPHEN 1000 MG: 500 TABLET, FILM COATED ORAL at 10:20

## 2023-07-13 RX ADMIN — PROPOFOL 120 MCG/KG/MIN: 10 INJECTION, EMULSION INTRAVENOUS at 14:02

## 2023-07-13 RX ADMIN — LIDOCAINE HYDROCHLORIDE 30 MG: 20 INJECTION, SOLUTION EPIDURAL; INFILTRATION; INTRACAUDAL; PERINEURAL at 13:58

## 2023-07-13 RX ADMIN — Medication 2000 MG: at 13:53

## 2023-07-13 ASSESSMENT — PAIN - FUNCTIONAL ASSESSMENT: PAIN_FUNCTIONAL_ASSESSMENT: 0-10

## 2023-07-13 NOTE — ANESTHESIA POSTPROCEDURE EVALUATION
Department of Anesthesiology  Postprocedure Note    Patient: Bong Connolly III  MRN: [id number]  YOB: 1960  Date of evaluation: 7/13/2023      Procedure Summary     Date: 07/13/23 Room / Location: [facility] OP OR 01 / [facility]    Anesthesia Start: 1353 Anesthesia Stop: 8931    Procedure: Left revision second proximal interphalangeal resection arthroplasty (Left: Toes) Diagnosis:       Hammertoe of left foot      (Hammertoe of left foot [M20.42])    Surgeons: Meli Diaz MD Responsible Provider: Elvia Mathews MD    Anesthesia Type: TIVA ASA Status: 2          Anesthesia Type: No value filed.     Farheen Phase I: Farheen Score: 6    Farheen Phase II: Farheen Score: 10      Anesthesia Post Evaluation    Patient location during evaluation: PACU  Patient participation: complete - patient participated  Level of consciousness: awake and alert  Pain score: 1  Airway patency: patent  Nausea & Vomiting: no nausea  Complications: no  Cardiovascular status: blood pressure returned to baseline and hemodynamically stable  Respiratory status: acceptable  Hydration status: euvolemic  Multimodal analgesia pain management approach

## 2023-07-13 NOTE — ANESTHESIA PRE PROCEDURE
Department of Anesthesiology  Preprocedure Note       Name:  Rebekah Steven III   Age:  70 y.o.  :  1960                                          MRN:  [id number]         Date:  2023      Surgeon: Dami Avila):  Elvie Ahuja MD    Procedure: Procedure(s):  Left revision second proximal interphalangeal resection arthroplasty    Medications prior to admission:   Prior to Admission medications    Medication Sig Start Date End Date Taking? Authorizing Provider   cyclobenzaprine (FLEXERIL) 10 MG tablet Take 1 tablet by mouth 3 times daily as needed 21  Yes Historical Provider, MD   ergocalciferol (ERGOCALCIFEROL) 1.25 MG (42788 UT) capsule Take 1 capsule by mouth once a week 23  Yes Historical Provider, MD   Venenhan Tracey 048 MG/ML SOAJ Every two weeks 23   Historical Provider, MD   omeprazole (PRILOSEC) 20 MG delayed release capsule Take 1 capsule by mouth Daily 23   Historical Provider, MD LEI  MG/ML SOAJ Four times per year 23   Historical Provider, MD   cephALEXin (KEFLEX) 500 MG capsule Take 1 capsule by mouth 4 times daily 23   KATHI Guerrero CNP   aspirin EC 81 MG EC tablet Take 1 tablet by mouth in the morning and at bedtime 23   KTAHI Guerrero CNP   diclofenac sodium (VOLTAREN) 1 % GEL Apply 4 g topically 4 times daily 10/17/22   Wendell Hodgkins III, MD   allopurinol (ZYLOPRIM) 100 MG tablet Take 1 tablet by mouth 2 times daily    Ar Automatic Reconciliation   amLODIPine (NORVASC) 2.5 MG tablet Take 1 tablet by mouth daily    Ar Automatic Reconciliation   gabapentin (NEURONTIN) 600 MG tablet Take 1 tablet by mouth 2 times daily.     Ar Automatic Reconciliation   hydroCHLOROthiazide (HYDRODIURIL) 25 MG tablet Take 1 tablet by mouth in the morning and at bedtime    Ar Automatic Reconciliation   Icosapent Ethyl (VASCEPA) 1 g CAPS capsule Take 1 g by mouth 2 times daily Takes 2 caps in am and 2 caps in pm 21   Ar

## 2023-07-13 NOTE — DISCHARGE INSTRUCTIONS
by your doctor. DIET  Clear liquids until no nausea or vomiting; then light diet for the first day. Advance to regular diet on second day, unless your doctor orders otherwise. If nausea and vomiting continues, call your doctor. PAIN  Take pain medication as directed by your doctor. Call your doctor if pain is NOT relieved by medication. DO NOT take aspirin of blood thinners unless directed by your doctor. MEDICATION INTERACTION:During your procedure you potentially received a medication or medications which may reduce the effectiveness of oral contraceptives. Please consider other forms of contraception for 1 month following your procedure if you are currently using oral contraceptives as your primary form of birth control. In addition to this, we recommend continuing your oral contraceptive as prescribed, unless otherwise instructed by your physician, during this time      [address] IF   Excessive bleeding that does not stop after holding pressure over the area  Temperature of 101 degrees F or above  Excessive redness, swelling or bruising, and/ or green or yellow, smelly discharge from incision    After general anesthesia or intravenous sedation, for 24 hours or while taking prescription Narcotics:  Limit your activities  A responsible adult needs to be with you for the next 24 hours  Do not drive and operate hazardous machinery  Do not make important personal or business decisions  Do not drink alcoholic beverages  If you have not urinated within 8 hours after discharge, and you are experiencing discomfort from urinary retention, please go to the nearest ED. If you have sleep apnea and have a CPAP machine, please use it for all naps and sleeping. Please use caution when taking narcotics and any of your home medications that may cause drowsiness. *  Please give a list of your current medications to your Primary Care Provider.   *  Please update this list whenever your medications are

## 2023-07-13 NOTE — OP NOTE
Operative Note    Patient:Silvano Myles III  MRN: [id number]    Date Of Surgery: 7/13/2023    Surgeon: Jimbo Rich MD    Assistant Surgeon: None    Pre Op Diagnosis:  Pre-Op Diagnosis Codes:     * Hammertoe of left foot [M20.42]      Post Op Diagnosis:   same    Procedures Performed:  Left revision second proximal interphalangeal joint resection arthroplasty, 37026    Implants:   Implant Name Type Inv. Item Serial No.  Lot No. LRB No. Used Action   WIRE ORTH 1.1MM AVI 229MM SMOOTH DBL BAYNT TIP S STL K - [id number]  WIRE ORTH 1.1MM AVI 229MM SMOOTH DBL BAYNT TIP Loye Comber [id number] Left 1 Implanted       Anesthesia:  Monitor Anesthesia Care    Blood Loss:  minimal    Tourniquet:  Estimated calf 15 minutes    Pre Operative Abx:   Ancef 2g            Pre Operative Course:  Zeeshan Muñoz III is a 70 y.o. male who has a history of previous left second hammertoe correction failure and has a loss of fixation with a broken screw in his second toe. Operation In Detail:  Patient was evaluated in the preoperative area. We had a long discussion about the procedure and postoperative protocols. The patient was then brought back to the operating room suite and placed in the operating room table. A timeout was taken to identify the patient, procedure being performed, and laterality. After this the patient was prepped and draped in the normal sterile fashion using a Betadine solution and/or a ChloraPrep solution. A timeout was then taken to identify the patient his name, date of birth, laterality, and procedure being performed. We also identified allergies and any concerns about the operation. Attention was then placed to the operative extremity. During a preop surgical timeout the left lower extremity was identified as the correct surgical site and prepped and draped in the standard sterile fashion using ChloraPrep solution.   A field block obtained with 0.5% plain

## 2023-07-13 NOTE — PERIOP NOTE
PACU DISCHARGE NOTE    Patient and family educated on discharge instructions. No additional questions at this time. Vital signs stable, pain well controlled, alert and oriented times three or at baseline, follow up per surgeon, no anesthetic complications.

## 2023-07-28 ENCOUNTER — TELEPHONE (OUTPATIENT)
Dept: ORTHOPEDIC SURGERY | Age: 63
End: 2023-07-28

## 2023-07-28 ENCOUNTER — HOSPITAL ENCOUNTER (OUTPATIENT)
Dept: ULTRASOUND IMAGING | Age: 63
Discharge: HOME | End: 2023-07-28
Attending: ORTHOPAEDIC SURGERY
Payer: COMMERCIAL

## 2023-07-28 DIAGNOSIS — M79.662 PAIN OF LEFT CALF: ICD-10-CM

## 2023-07-28 DIAGNOSIS — M79.662 PAIN OF LEFT CALF: Primary | ICD-10-CM

## 2023-07-28 DIAGNOSIS — M79.89 CALF SWELLING: ICD-10-CM

## 2023-07-28 PROCEDURE — 93971 EXTREMITY STUDY: CPT

## 2023-07-31 ENCOUNTER — OFFICE VISIT (OUTPATIENT)
Dept: ORTHOPEDIC SURGERY | Age: 63
End: 2023-07-31

## 2023-07-31 DIAGNOSIS — M20.42 HAMMER TOE OF LEFT FOOT: Primary | ICD-10-CM

## 2023-07-31 PROCEDURE — 99024 POSTOP FOLLOW-UP VISIT: CPT | Performed by: NURSE PRACTITIONER

## 2023-07-31 NOTE — PROGRESS NOTES
Name: Bharath White III  YOB: 1960  Gender: male  MRN: [id number]    Procedure Performed: Left revision second proximal interphalangeal joint resection arthroplasty        Date of Procedure: 07/13/2023      Subjective: Patient reports doing better since being evaluated last Friday for DVT. He notes the boot has been causing a lot of calf pain and swelling. Physical Examination: K wire and Jurgan pinball are intact in the second phalanx. He has palpable pulses and intact sensation to the foot. He has noted swelling throughout the dorsal aspect of the foot as well as second phalanx. The original incisional areas are still continuing to heal at this time and show no signs of infection although there is mild erythema present around them at this time. Is able to wiggle the remaining lesser toes without difficulty or pain. He has no pain with palpation to the second MTP joint plantarly. Imaging:   No imaging reviewed          Assessment:   Status post left revision of second PIP RA. Plan:   3 This is stable chronic illness/condition  Treatment at this time:  Patient will swap from the cam walker boot back into a postop shoe as this is more comfortable for his foot at this time and acceptable for the recovery. He will continue to elevate the affected extremity as needed for swelling. He would continue to monitor and protect the K wire in pinball in the second phalanx. He may now get the affected extremity wet including showering only. He will continue to monitor and signs symptoms of the other healing wounds for infection. He will continue to limit his activity levels at this time excluding high-impact. He will follow-up in 2 weeks or sooner if needed for pin out x-ray. Studies ordered:  Foot XR needed @ Next Visit    Weight-bearing status: WBAT in Boot/hardsole shoe        Return to work/work restrictions: none  No medications given

## 2023-07-31 NOTE — PROGRESS NOTES
The patient was prescribed and fitted with a post op shoe for the left foot, size XL. Patient read and signed documenting they understand and agree to [facility]'s current DME return policy.

## 2023-08-14 ENCOUNTER — OFFICE VISIT (OUTPATIENT)
Dept: ORTHOPEDIC SURGERY | Age: 63
End: 2023-08-14

## 2023-08-14 DIAGNOSIS — M79.672 LEFT FOOT PAIN: ICD-10-CM

## 2023-08-14 DIAGNOSIS — M20.5X2 ACQUIRED CLAW TOE OF LEFT FOOT: Primary | ICD-10-CM

## 2023-08-14 DIAGNOSIS — M20.42 HAMMER TOE OF LEFT FOOT: ICD-10-CM

## 2023-08-14 PROCEDURE — 99024 POSTOP FOLLOW-UP VISIT: CPT | Performed by: NURSE PRACTITIONER

## 2023-08-14 NOTE — PROGRESS NOTES
Name: Destiny Espinosa III  YOB: 1960  Gender: male  MRN: [id number]    Procedure Performed: Left revision second proximal interphalangeal joint resection arthroplasty           Date of Procedure: 07/13/2023      Subjective: Patient is very happy about getting the pin removed today. He has questions regarding the second toenail today. Physical Examination: K wire was intact in the second phalanx initially at today's visit. He has palpable pulses and intact sensation to the foot. He is able to wiggle the lesser toes without pain or difficulty. His swelling is still pretty significant throughout the dorsal aspect of his foot and toes. Incisional areas appear to be healing and do not have any signs of infection at this time. Second toenail is intact at the nailbed. Imaging:   I independently interpreted XR taken today  Left foot XR: AP, Lateral, Oblique views     ICD-10-CM    1. Acquired claw toe of left foot  M20.5X2 XR FOOT LEFT (MIN 3 VIEWS)      2. Hammer toe of left foot  M20.42 XR FOOT LEFT (MIN 3 VIEWS)      3. Left foot pain  M79.672 XR FOOT LEFT (MIN 3 VIEWS)         Report: AP, lateral, oblique x-ray of the left foot demonstrates hammertoe correction second phalanx    Impression: Hammertoe correction second phalanx. KATHI Wilburn - CNP           Assessment:   Status post left revision of second PIP RA. Patient will continue to monitor for signs and symptoms of infection to the second toe. Plan:   3 This is stable chronic illness/condition  Treatment at this time: K wire and Timothy Demetrius were extracted from the second phalanx today without difficulty. He may now wean from the walker boot and back in a comfortable shoes as he can tolerate and swelling allows. He may now soak the affected extremity, Epsom salts was encouraged to help with additional incisional healing. He will continue to monitor for signs and symptoms of infection.   May resume

## 2023-08-31 DIAGNOSIS — M79.672 LEFT FOOT PAIN: ICD-10-CM

## 2023-08-31 DIAGNOSIS — M20.42 HAMMER TOE OF LEFT FOOT: Primary | ICD-10-CM

## 2023-08-31 DIAGNOSIS — M20.5X2 ACQUIRED CLAW TOE OF LEFT FOOT: ICD-10-CM

## 2023-09-06 ENCOUNTER — TELEPHONE (OUTPATIENT)
Dept: ORTHOPEDIC SURGERY | Age: 63
End: 2023-09-06

## 2023-09-06 NOTE — TELEPHONE ENCOUNTER
Patient is traveling to [address] today was told to go to the ER upon his arrival to rule out a DVT. Pt is spoke with CANDIDO Lancaster about this issue and his symptoms. Pt verbalize understanding of today conversation and will alert us of the outcome.

## 2023-09-11 ENCOUNTER — OFFICE VISIT (OUTPATIENT)
Dept: ORTHOPEDIC SURGERY | Age: 63
End: 2023-09-11

## 2023-09-11 DIAGNOSIS — M19.079 ARTHRITIS OF SUBTALAR JOINT: Primary | ICD-10-CM

## 2023-09-11 DIAGNOSIS — M20.42 HAMMER TOE OF LEFT FOOT: ICD-10-CM

## 2023-09-11 PROCEDURE — 99024 POSTOP FOLLOW-UP VISIT: CPT | Performed by: ORTHOPAEDIC SURGERY

## 2023-09-11 NOTE — PROGRESS NOTES
Name: Rebekah Steven III  YOB: 1960  Gender: male  MRN: [id number]    Procedure Performed:Left revision second proximal interphalangeal resection arthroplasty - Left        Date of Procedure: 7/13/2023      Subjective: Concerned about swelling in his leg. He went on a recent trip to [place] and was seen in the emergency room there and had a Doppler ultrasound which is negative for DVT. The ER doctor also felt he had no sign of infection. Physical Examination: The wounds are well-healed. Has appropriate swelling and good alignment. There is no sign of infection are noted. Imaging:   I independently interpreted XR taken today  Left foot and ankle XR: AP, Lateral, Oblique views     ICD-10-CM    1. Arthritis of subtalar joint  M19.079 XR ANKLE LEFT (MIN 3 VIEWS)     XR FOOT LEFT (2 VIEWS)      2. Hammer toe of left foot  M20.42 XR ANKLE LEFT (MIN 3 VIEWS)     XR FOOT LEFT (2 VIEWS)         Report: AP, lateral, oblique x-ray of the left foot and ankle demonstrates 1 broken staple time    Impression: 1 broken staple time   Wendell Hodgkins III, MD           Assessment:   Left triple arthrodesis and then revision second hammertoe surgery with swelling      Plan:   3 This is stable chronic illness/condition  Treatment at this time: Physical Therapy  Studies ordered: CT scan ordered at this Visit    Weight-bearing status: WBAT        Return to work/work restrictions: none  No medications given    Clinically his x-rays look pretty encouraging. Given his ongoing persistent pain I recommended a CT scan of the left ankle to evaluate both the talonavicular and subtalar joint fusions. He is asked about the swelling today. I think it be smart to start some edema therapy for this.

## 2023-09-15 ENCOUNTER — HOSPITAL ENCOUNTER (OUTPATIENT)
Dept: PHYSICAL THERAPY | Age: 63
Setting detail: RECURRING SERIES
Discharge: HOME | End: 2023-09-18
Payer: MEDICARE

## 2023-09-15 DIAGNOSIS — R60.0 LOCALIZED EDEMA: Primary | ICD-10-CM

## 2023-09-15 DIAGNOSIS — I89.0 LYMPHEDEMA, NOT ELSEWHERE CLASSIFIED: ICD-10-CM

## 2023-09-15 PROCEDURE — 97166 OT EVAL MOD COMPLEX 45 MIN: CPT

## 2023-09-15 PROCEDURE — 97535 SELF CARE MNGMENT TRAINING: CPT

## 2023-09-15 ASSESSMENT — PAIN DESCRIPTION - LOCATION: LOCATION: FOOT

## 2023-09-15 ASSESSMENT — PAIN SCALES - GENERAL: PAINLEVEL_OUTOF10: 6

## 2023-09-15 ASSESSMENT — PAIN DESCRIPTION - ORIENTATION: ORIENTATION: LEFT

## 2023-09-15 NOTE — PROGRESS NOTES
Jamie Navarrourry III  : 1960  Primary: Medicare Part A (Medicare)  Secondary: Kali Mckeon [place] [address] [address] [facility] [address]  Phone: [phone]  Fax: [phone]    >OT Visit Info:   Plan Frequency: 2x/week for 90 days  Certification Period Expiration Date: 23  Total # of Visits Approved: 30  Total # of Visits to Date: 1  Progress Note Counter: 1     Visit Count: Visit count could not be calculated. Make sure you are using a visit which is associated with an episode. OUTPATIENT OCCUPATIONAL THERAPY: Treatment Note 9/15/2023  Episode: (LE edema)   Appt Desk      Medical/Referring Diagnosis:  Primary osteoarthritis, unspecified ankle and foot [M19.079]  Other hammer toe(s) (acquired), left foot [M20.42]  Referring Physician:  Kenneth Michelle MD MD Orders:  OT Eval and Treat   Date of Onset:  Onset Date: 23 (Ankle fusion; non weight bearing for 6 weeks)     Allergies:  Fenofibrate micronized and Statins  Restrictions/Precautions:    No data recordedNo data recorded  Medications Last Reviewed:  9/15/2023     Interventions Planned: (Treatment may consist of any combination of the following)  Current Treatment Recommendations: Strengthening; ROM; Patient/Caregiver education & training; Self-Care / ADL; Manual Therapy:  MLD     >Subjective Comments:  Patient reports swelling in left leg began after surgery. >Initial: Left Foot 6/10 >Post Session: Left Foot 6/10  Medications Last Reviewed:  9/15/2023  Updated Objective Findings:  See Evaluation Note from today  Treatment   SELF CARE: (15 minutes):   Procedure(s) (per grid) utilized to improve and/or restore self-care/home management as related to  home management of edema/lymphedema . Required moderate verbal cueing to facilitate  education .   Patient educated re: anatomy and physiology of LE lymphatic system as well as complete decongestive therapy for the left LE to include skin

## 2023-09-19 ENCOUNTER — HOSPITAL ENCOUNTER (OUTPATIENT)
Dept: CT IMAGING | Age: 63
Discharge: HOME | End: 2023-09-22
Attending: ORTHOPAEDIC SURGERY
Payer: COMMERCIAL

## 2023-09-19 DIAGNOSIS — M19.079 ARTHRITIS OF SUBTALAR JOINT: ICD-10-CM

## 2023-09-19 DIAGNOSIS — M20.42 HAMMER TOE OF LEFT FOOT: ICD-10-CM

## 2023-09-19 PROCEDURE — 73700 CT LOWER EXTREMITY W/O DYE: CPT

## 2023-09-20 ENCOUNTER — CLINICAL DOCUMENTATION (OUTPATIENT)
Dept: ORTHOPEDIC SURGERY | Age: 63
End: 2023-09-20

## 2023-09-20 ENCOUNTER — HOSPITAL ENCOUNTER (OUTPATIENT)
Dept: PHYSICAL THERAPY | Age: 63
Setting detail: RECURRING SERIES
Discharge: HOME | End: 2023-09-23
Payer: MEDICARE

## 2023-09-20 PROCEDURE — 97535 SELF CARE MNGMENT TRAINING: CPT

## 2023-09-20 PROCEDURE — 97140 MANUAL THERAPY 1/> REGIONS: CPT

## 2023-09-20 ASSESSMENT — PAIN SCALES - GENERAL: PAINLEVEL_OUTOF10: 0

## 2023-09-20 NOTE — PROGRESS NOTES
Spoke to patient regarding his scan. Per Dr. Huffman Payment the CT looked good with healing. Patient was also informed to remain in his walker boot until his appointment on 10-02-23. Patient voiced understanding.

## 2023-09-20 NOTE — PROGRESS NOTES
Raquel Epi GUZMAN  : 1960  Primary: Medicare Part A (Medicare)  Secondary: Michelle Blankenship [address] [address]  Phone: [phone]  Fax: [phone]    >OT Visit Info:   Plan Frequency: 2x/week for 90 days  Certification Period Expiration Date: 23  Total # of Visits Approved: 30  Total # of Visits to Date: 2  Progress Note Counter: 2     Visit Count: Visit count could not be calculated. Make sure you are using a visit which is associated with an episode. OUTPATIENT OCCUPATIONAL THERAPY: Treatment Note 2023  Episode: (LE edema)   Appt Desk      Medical/Referring Diagnosis:  Primary osteoarthritis, unspecified ankle and foot [M19.079]  Other hammer toe(s) (acquired), left foot [M20.42]  Referring Physician:  Pattie Grimes MD MD Orders:  OT Eval and Treat   Date of Onset:  Onset Date: 23 (Ankle fusion; non weight bearing for 6 weeks)     Allergies:  Fenofibrate micronized and Statins  Restrictions/Precautions:    No data recordedNo data recorded  Medications Last Reviewed:  2023     Interventions Planned: (Treatment may consist of any combination of the following)  Current Treatment Recommendations: Strengthening; ROM; Patient/Caregiver education & training; Self-Care / ADL;  Manual Therapy:  MLD     >Subjective Comments:  Patient reports he has been wearing his knee-high compression socks daily since his first visit last week with a noticable decrease in swelling in the left leg.  >Initial:     0/10 >Post Session:     0/10  Medications Last Reviewed:  2023  Updated Objective Findings:   See below  Lymphedema:  Circumference Measurements (Lower Extremity):                  Date:  9/15 Date:    Date: Date: Date:   CM from base of heel Right Left Right Left Right Left Right Left Right Left   Foot  20 27.6 29    28.4               Ankle 10 25.9 29.9   29                Calf 30 33.1 40.3    36.1

## 2023-09-22 ENCOUNTER — HOSPITAL ENCOUNTER (OUTPATIENT)
Dept: PHYSICAL THERAPY | Age: 63
Setting detail: RECURRING SERIES
Discharge: HOME | End: 2023-09-25
Payer: MEDICARE

## 2023-09-22 PROCEDURE — 97140 MANUAL THERAPY 1/> REGIONS: CPT

## 2023-09-22 PROCEDURE — 97535 SELF CARE MNGMENT TRAINING: CPT

## 2023-09-22 ASSESSMENT — PAIN SCALES - GENERAL: PAINLEVEL_OUTOF10: 0

## 2023-09-22 NOTE — PROGRESS NOTES
[address]   9/26/2023 10:15 AM Annabella Snellen, OT [facility]   10/2/2023 10:20 AM [facility], APRN - CNP PO[facility] AMB

## 2023-09-26 ENCOUNTER — APPOINTMENT (OUTPATIENT)
Dept: PHYSICAL THERAPY | Age: 63
End: 2023-09-26
Payer: MEDICARE

## 2023-10-02 ENCOUNTER — OFFICE VISIT (OUTPATIENT)
Dept: ORTHOPEDIC SURGERY | Age: 63
End: 2023-10-02

## 2023-10-02 DIAGNOSIS — M20.42 HAMMER TOE OF LEFT FOOT: ICD-10-CM

## 2023-10-02 DIAGNOSIS — M19.079 ARTHRITIS OF SUBTALAR JOINT: ICD-10-CM

## 2023-10-02 DIAGNOSIS — M79.672 LEFT FOOT PAIN: Primary | ICD-10-CM

## 2023-10-02 DIAGNOSIS — M20.5X2 ACQUIRED CLAW TOE OF LEFT FOOT: ICD-10-CM

## 2023-10-02 DIAGNOSIS — M79.672 LEFT FOOT PAIN: ICD-10-CM

## 2023-10-02 DIAGNOSIS — M20.42 HAMMER TOE OF LEFT FOOT: Primary | ICD-10-CM

## 2023-10-02 PROCEDURE — 99024 POSTOP FOLLOW-UP VISIT: CPT | Performed by: NURSE PRACTITIONER

## 2023-10-02 NOTE — PROGRESS NOTES
Name: Monique Tony III  YOB: 1960  Gender: male  MRN: [id number]    Procedure Performed: Left revision second proximal interphalangeal joint resection arthroplasty      Date of Procedure: 07/13/2023      Subjective: Patient notes that lymphedema therapy has been going well and that overall the foot is starting to feel better. Physical Examination: All incisional areas are well-healed there are no signs of infection. He has palpable pulses and intact sensation to the foot. Range of motion to the second and third phalanx at the MTP joint was performed with mild stiffness however there was no discomfort. There is still noted swelling to both affected toes. Imaging:   I independently interpreted XR taken today  Left foot XR: AP, Lateral, Oblique views     ICD-10-CM    1. Hammer toe of left foot  M20.42       2. Left foot pain  M79.672       3. Acquired claw toe of left foot  M20.5X2          Report: AP, lateral, oblique x-ray of the left foot demonstrates no hardware failures    Impression: No hardware failures   KATHI Mendiola - GENA           Assessment:   Status post revision of left second PIP RA. CT scan was reviewed with the patient and the patient was sent to therapy for lymphedema. He will begin physical therapy starting today to increase [place] ability the overall foot. Plan:   3 This is stable chronic illness/condition  Treatment at this time: Physical Therapy patient will follow-up in approximate 3 months or sooner if needed with x-ray. Studies ordered:  Foot XR needed @ Next Visit    Weight-bearing status: WBAT        Return to work/work restrictions: none  No medications given

## 2023-10-03 ENCOUNTER — HOSPITAL ENCOUNTER (OUTPATIENT)
Dept: PHYSICAL THERAPY | Age: 63
Setting detail: RECURRING SERIES
Discharge: HOME | End: 2023-10-06
Payer: MEDICARE

## 2023-10-03 DIAGNOSIS — M25.572 PAIN IN LEFT ANKLE AND JOINTS OF LEFT FOOT: Primary | ICD-10-CM

## 2023-10-03 DIAGNOSIS — M25.675 STIFFNESS OF LEFT FOOT, NOT ELSEWHERE CLASSIFIED: ICD-10-CM

## 2023-10-03 DIAGNOSIS — R26.2 DIFFICULTY IN WALKING, NOT ELSEWHERE CLASSIFIED: ICD-10-CM

## 2023-10-03 PROCEDURE — 97110 THERAPEUTIC EXERCISES: CPT

## 2023-10-03 PROCEDURE — 97162 PT EVAL MOD COMPLEX 30 MIN: CPT

## 2023-10-03 ASSESSMENT — PAIN SCALES - GENERAL: PAINLEVEL_OUTOF10: 7

## 2023-10-03 NOTE — THERAPY EVALUATION
Kinza West III  : 1960  Primary: Medicare Part A ([place] [employer])  Secondary:   [place] [place] [address] [facility] [place]  [address] [facility] [address]  Phone: [phone]  Fax: [phone] Plan Frequency: 2x/wk for 90 days    Plan of Care/Certification Expiration Date: 24      PT Visit Info:  Plan Frequency: 2x/wk for 90 days  Plan of Care/Certification Expiration Date: 24  Total # of Visits Approved: 30  Total # of Visits to Date: 1  Progress Note Counter: 1      Visit Count:  1                OUTPATIENT PHYSICAL THERAPY:             OP NOTE TYPE: Initial Assessment 10/3/2023               Episode (PT: s/p Left Foot Surgery) Appt Desk         Treatment Diagnosis:    Visit Diagnoses         Codes    Pain in left ankle and joints of left foot    -  Primary M25.572    Difficulty in walking, not elsewhere classified     R26.2    Stiffness of left foot, not elsewhere classified     M25.675          Medical/Referring Diagnosis:  Left foot pain [M79.672]  Hammer toe of left foot [M20.42]  Acquired claw toe of left foot [M20.5X2]  Referring Physician:  Pio Granado, APRN - CNP  MD Orders:  PT Eval and Treat   Return MD Appt:  23  Date of Onset:     L Foot Surgery 23 and 23  Allergies:  Fenofibrate micronized and Statins  Restrictions/Precautions:      L Foot Surgery     Medications Last Reviewed:  10/3/2023     SUBJECTIVE   History of Injury/Illness (Reason for Referral):  Pt has RA. He had R foot surgery 2017 for achilles tendon lengthening and an \"artificial arch built\". He states he started having L foot pain 18 months ago. He had several cortisone injections over the past 18 months. He had continued pain so he opted for L foot surgery.   Pt had surgery 23 for:  Left triple hemisection Achilles lengthening  Left subtalar joint arthrodesis  Left talonavicular joint arthrodesis  Left second third and fourth proximal

## 2023-10-05 ENCOUNTER — HOSPITAL ENCOUNTER (OUTPATIENT)
Dept: PHYSICAL THERAPY | Age: 63
Setting detail: RECURRING SERIES
Discharge: HOME | End: 2023-10-08
Payer: MEDICARE

## 2023-10-05 PROCEDURE — 97110 THERAPEUTIC EXERCISES: CPT

## 2023-10-05 PROCEDURE — 97140 MANUAL THERAPY 1/> REGIONS: CPT

## 2023-10-05 ASSESSMENT — PAIN SCALES - GENERAL: PAINLEVEL_OUTOF10: 4

## 2023-10-05 NOTE — PROGRESS NOTES
Calixto Ambrose, PT [facility]   10/24/2023  8:45 AM [facility] Justin Mccullough, PTA [facility] S[facility]   10/26/2023  8:45 AM Vasquez Delgado, PTA [facility]   10/31/2023  8:45 AM [facility] Justin Mccullough, PTA [facility]   1/8/2024  9:00 AM Elby Gitelman, Fredricka Mayhew, APRN - CNP PO[facility] AMB

## 2023-10-09 ENCOUNTER — HOSPITAL ENCOUNTER (OUTPATIENT)
Dept: PHYSICAL THERAPY | Age: 63
Setting detail: RECURRING SERIES
Discharge: HOME | End: 2023-10-12
Payer: MEDICARE

## 2023-10-09 PROCEDURE — 97140 MANUAL THERAPY 1/> REGIONS: CPT

## 2023-10-09 PROCEDURE — 97535 SELF CARE MNGMENT TRAINING: CPT

## 2023-10-09 ASSESSMENT — PAIN SCALES - GENERAL: PAINLEVEL_OUTOF10: 0

## 2023-10-09 NOTE — PROGRESS NOTES
Celestino Laura GUZMAN  : 1960  Primary: Medicare Part A (Medicare)  Secondary: [place] [address] [address] [facility] [address]  Phone: [phone]  Fax: [phone]    >OT Visit Info:   Plan Frequency: 2x/week for 90 days  Certification Period Expiration Date: 23  Total # of Visits Approved: 30  Total # of Visits to Date: 4  Progress Note Counter: 4     Visit Count: Visit count could not be calculated. Make sure you are using a visit which is associated with an episode. OUTPATIENT OCCUPATIONAL THERAPY: Treatment Note 10/9/2023  Episode: (LE edema)   Appt Desk      Medical/Referring Diagnosis:  Primary osteoarthritis, unspecified ankle and foot [M19.079]  Other hammer toe(s) (acquired), left foot [M20.42]  Referring Physician:  Araseli Velasco MD MD Orders:  OT Eval and Treat   Date of Onset:  Onset Date: 23 (Ankle fusion; non weight bearing for 6 weeks)     Allergies:  Fenofibrate micronized and Statins  Restrictions/Precautions:    No data recordedNo data recorded  Medications Last Reviewed:  10/9/2023     Interventions Planned: (Treatment may consist of any combination of the following)  Current Treatment Recommendations: Strengthening; ROM; Patient/Caregiver education & training; Self-Care / ADL;  Manual Therapy:  MLD     >Subjective Comments:  Patient reports his left ankle has felt more swollen and hurts off and on.  >Initial:     0/10 >Post Session:     0/10  Medications Last Reviewed:  10/9/2023  Updated Objective Findings:   See below  Lymphedema:  Circumference Measurements (Lower Extremity):                  Date:  9/15 Date:    Date:   Date:  10/9 Date:   CM from base of heel Right Left Right Left Right Left Right Left Right Left   Foot  20 27.6 29    28.4   29    28        Ankle 10 25.9 29.9   29     29.3    29.7       Calf 30 33.1 40.3    36.1    35.4    36       Knee 50 39.8 42.5    41.9    40.9    40.7

## 2023-10-10 ENCOUNTER — HOSPITAL ENCOUNTER (OUTPATIENT)
Dept: PHYSICAL THERAPY | Age: 63
Setting detail: RECURRING SERIES
Discharge: HOME | End: 2023-10-13
Payer: MEDICARE

## 2023-10-10 PROCEDURE — 97110 THERAPEUTIC EXERCISES: CPT

## 2023-10-10 PROCEDURE — 97140 MANUAL THERAPY 1/> REGIONS: CPT

## 2023-10-10 NOTE — PROGRESS NOTES
below to improve mobility and strength. Required minimal verbal cues to promote proper body alignment and promote proper body posture. Progressed resistance and repetitions as indicated. MANUAL THERAPY: (15 minutes): Soft tissue mobilization was utilized and necessary because of the patient's restricted motion of soft tissue. To L achilles tendon and plantar fascia. Date:  10-03-23 Date:  10-10-23 Date:     Activity/Exercise Parameters Parameters Parameters   AROM L Ankle Dorsiflexion, Plantarflexion 15 reps  L LE  (HEP) 20 reps  L LE    L Ankle Alphabet A-Z  1 rep  L LE  (HEP) A-Z  1 rep    L Gastroc Stretch with Towel/Strap 3 reps  20 sec holds  L LE  (HEP) 3 reps  20 sec holds    Toe Scrunches with Towel 15 reps  L LE  (HEP) 20 reps  L LE    NuStep  Level 4  6 minutres    Standing Balance Board-Forward/Backward  20 reps each    SLS L LE  5 reps to Fatigue    Theraband Ankle Dorsiflexion and Plantarflexion  Green T-band  20 reps each              Treatment/Session Summary:    >Treatment Assessment:  Patient tolerated tx well today- decreased balance with Left ankle at this time. Communication/Consultation:  None today  Equipment provided today:  None  Recommendations/Intent for next treatment session: Next visit will focus on progression of ROM/strengthening L ankle as tolerable, balance training, manual therapy.     >Total Treatment Billable Duration:  40 minutes  Time In: 0845  Time Out: 0930    MISTI Pino PTA       Charge Capture  }Post Session Pain  PT Visit Info  [employer] [facility]  MD Guidelines  Scanned Media  Benefits  MyChart    Future Appointments   Date Time Provider [address]   10/12/2023  8:45 AM Loly Reyes PTA [facility]   10/12/2023  9:30 AM Berna Nick OT [facility]   10/17/2023  8:45 AM Anika Rho, PT [facility]   10/19/2023  8:45 AM Anika Rho, PT [facility]   10/24/2023  8:45 AM Loly Reyes PTA [facility] S[facility]   10/26/2023

## 2023-10-12 ENCOUNTER — HOSPITAL ENCOUNTER (OUTPATIENT)
Dept: PHYSICAL THERAPY | Age: 63
Setting detail: RECURRING SERIES
Discharge: HOME | End: 2023-10-15
Payer: MEDICARE

## 2023-10-12 PROCEDURE — 97110 THERAPEUTIC EXERCISES: CPT

## 2023-10-12 PROCEDURE — 97140 MANUAL THERAPY 1/> REGIONS: CPT

## 2023-10-12 ASSESSMENT — PAIN SCALES - GENERAL: PAINLEVEL_OUTOF10: 5

## 2023-10-12 NOTE — PROGRESS NOTES
Roland Pulido III  : 1960  Primary: Medicare Part A (Medicare)  Secondary: Krishna Lovett [address] [facility] [address]  Phone: [phone]  Fax: [phone]    >OT Visit Info:   Plan Frequency: 2x/week for 90 days  Certification Period Expiration Date: 23  Total # of Visits Approved: 30  Total # of Visits to Date: 5  Progress Note Counter: 5     Visit Count: Visit count could not be calculated. Make sure you are using a visit which is associated with an episode. OUTPATIENT OCCUPATIONAL THERAPY: Treatment Note 10/12/2023  Episode: (LE edema)   Appt Desk      Medical/Referring Diagnosis:  Primary osteoarthritis, unspecified ankle and foot [M19.079]  Other hammer toe(s) (acquired), left foot [M20.42]  Referring Physician:  Damaso Payton MD MD Orders:  OT Eval and Treat   Date of Onset:  Onset Date: 23 (Ankle fusion; non weight bearing for 6 weeks)     Allergies:  Fenofibrate micronized and Statins  Restrictions/Precautions:    No data recordedNo data recorded  Medications Last Reviewed:  10/12/2023     Interventions Planned: (Treatment may consist of any combination of the following)  Current Treatment Recommendations: Strengthening; ROM; Patient/Caregiver education & training; Self-Care / ADL; Manual Therapy:  MLD     >Subjective Comments:  Patient reports he is always in pain.   >Initial:     5/10 >Post Session:     5/10  Medications Last Reviewed:  10/12/2023  Updated Objective Findings:   See below  Lymphedema:  Circumference Measurements (Lower Extremity):   Date:  10/12 Date:   Date: Date: Date:   CM from base of heel Right Left Right Left Right Left Right Left Right Left   Foot  20  28           Ankle 10  29.1           Calf 30  35.7           Knee 50  41.2           Thigh                                 Date:  9/15 Date:    Date:   Date:  10/9 Date:  10/10   CM from base of heel Right Left Right Left

## 2023-10-12 NOTE — PROGRESS NOTES
8:45 AM Arthurine Annika, PT [facility] S[facility]   10/24/2023  8:45 AM [place], Getachew Beards, PTA [facility]   10/26/2023  8:45 AM [place], Getachew Beards, PTA [facility]   10/31/2023  8:45 AM [place], Getachew Beards, PTA [facility]   1/8/2024  9:00 AM Rosmery Valdes, Jennifer Hope, APRN - CNP [facility] AMB

## 2023-10-17 ENCOUNTER — HOSPITAL ENCOUNTER (OUTPATIENT)
Dept: PHYSICAL THERAPY | Age: 63
Setting detail: RECURRING SERIES
Discharge: HOME | End: 2023-10-20
Payer: MEDICARE

## 2023-10-17 PROCEDURE — 97110 THERAPEUTIC EXERCISES: CPT

## 2023-10-17 PROCEDURE — 97140 MANUAL THERAPY 1/> REGIONS: CPT

## 2023-10-17 ASSESSMENT — PAIN SCALES - GENERAL: PAINLEVEL_OUTOF10: 5

## 2023-10-18 ENCOUNTER — OFFICE VISIT (OUTPATIENT)
Dept: ORTHOPEDIC SURGERY | Age: 63
End: 2023-10-18

## 2023-10-18 DIAGNOSIS — M19.079 ARTHRITIS OF SUBTALAR JOINT: ICD-10-CM

## 2023-10-18 DIAGNOSIS — M20.42 HAMMER TOE OF LEFT FOOT: Primary | ICD-10-CM

## 2023-10-18 NOTE — PROGRESS NOTES
Name: Juan Baker III  YOB: 1960  Gender: male  MRN: [id number]    Summary:     Left triple arthrodesis and claw toe corrections     CC: New Patient (Left ankle and foot )       HPI: Juan Baker III is a 70 y.o. male who presents with New Patient (Left ankle and foot )  . This patient presents to the office today about 4 and half months out from left triple arthrodesis. He did subsequently have a second claw toe revision done after he bent the screw in his second toe which is doing better. He is in physical therapy now. In addition to that he is also doing edema therapy and wearing compression stockings. He is noticed a click in his ankle which has a therapist somewhat concerned. History was obtained by Patient     ROS/Meds/PSH/PMH/FH/SH: I personally reviewed the patients standard intake form. Below are the pertinents    Tobacco:  reports that he quit smoking about 27 years ago. His smoking use included cigarettes. He smoked an average of 1.5 packs per day. He has never used smokeless tobacco.  Diabetes: None      Physical Examination:    Exam of the left foot and ankle shows expected stiffness and swelling. His incision is well-healed without sign of infection. I think the click is in the posterior medial aspect of his ankle. Imaging:   I independently interpreted XR taken today  Foot and ankle XR: AP, Lateral, Oblique views     ICD-10-CM    1. Hammer toe of left foot  M20.42 XR ANKLE LEFT (MIN 3 VIEWS)     XR FOOT LEFT (2 VIEWS)      2.  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

## 2023-10-19 ENCOUNTER — HOSPITAL ENCOUNTER (OUTPATIENT)
Dept: PHYSICAL THERAPY | Age: 63
Setting detail: RECURRING SERIES
Discharge: HOME | End: 2023-10-22
Payer: MEDICARE

## 2023-10-19 PROCEDURE — 97140 MANUAL THERAPY 1/> REGIONS: CPT

## 2023-10-19 PROCEDURE — 97110 THERAPEUTIC EXERCISES: CPT

## 2023-10-19 ASSESSMENT — PAIN SCALES - GENERAL: PAINLEVEL_OUTOF10: 4

## 2023-10-19 NOTE — PROGRESS NOTES
Hunter Serranof III  : 1960  Primary: Medicare Part A ([facility])  Secondary:  [address] @ [facility]  [address] [facility] [address]  [address] [address]  Phone: [phone]  Fax: [phone] Plan Frequency: 2x/wk for 90 days    Plan of Care/Certification Expiration Date: 24      >PT Visit Info:   Plan Frequency: 2x/wk for 90 days  Plan of Care/Certification Expiration Date: 24  Total # of Visits Approved: 30  Total # of Visits to Date: 5  Progress Note Counter: 5      Visit Count:  3    OUTPATIENT PHYSICAL THERAPY:OP NOTE TYPE: OP Note Type: Treatment Note 10/19/2023       Episode  }Appt Desk             Treatment Diagnosis:    Visit Diagnoses           Codes     Pain in left ankle and joints of left foot    -  Primary M25.572     Difficulty in walking, not elsewhere classified     R26.2     Stiffness of left foot, not elsewhere classified     M25.675        Medical/Referring Diagnosis:  Left foot pain [M79.672]  Hammer toe of left foot [M20.42]  Acquired claw toe of left foot [M20.5X2]  Referring Physician:  Demarcus Luis, APRN - CNP  MD Orders:  PT Eval and Treat   Date of Onset:  L Foot Surgery 23 and 23  Allergies:   Fenofibrate micronized and Statins  Restrictions/Precautions:  No data recordedNo data recorded     Interventions Planned (Treatment may consist of any combination of the following):    Current Treatment Recommendations: Strengthening; ROM; Balance training; Gait training; Stair training; Manual; Pain management; Home exercise program; Patient/Caregiver education & training; Modalities       >Subjective Comments:   MD said toe is okay. Yeimi Rehman it is blood blister and he must have hit it. MD said he is healing well and everything is normal healing at this point.  MD said continue with PT until end of the month.  >Initial:     4/10>Post Session:     4/10  Medications Last Reviewed:  10/19/2023  Updated Objective Findings:   see

## 2023-10-24 ENCOUNTER — HOSPITAL ENCOUNTER (OUTPATIENT)
Dept: PHYSICAL THERAPY | Age: 63
Setting detail: RECURRING SERIES
Discharge: HOME | End: 2023-10-27
Payer: MEDICARE

## 2023-10-24 PROCEDURE — 97110 THERAPEUTIC EXERCISES: CPT

## 2023-10-24 PROCEDURE — 97140 MANUAL THERAPY 1/> REGIONS: CPT

## 2023-10-24 NOTE — PROGRESS NOTES
Nuha Meade III  : 1960  Primary: Medicare Part A ([place] [employer])  Secondary:  [facility] [place] [address] [facility]  [facility] [address] [facility] [address]  Phone: [phone]  Fax: [phone] Plan Frequency: 2x/wk for 90 days    Plan of Care/Certification Expiration Date: 24      >PT Visit Info:   Plan Frequency: 2x/wk for 90 days  Plan of Care/Certification Expiration Date: 24  Total # of Visits Approved: 30  Total # of Visits to Date: 6  Progress Note Counter: 6      Visit Count:  Visit count could not be calculated. Make sure you are using a visit which is associated with an episode.     OUTPATIENT PHYSICAL THERAPY:OP NOTE TYPE: OP Note Type: Treatment Note 10/24/2023       Episode  }Appt Desk             Treatment Diagnosis:    Visit Diagnoses           Codes     Pain in left ankle and joints of left foot    -  Primary M25.572     Difficulty in walking, not elsewhere classified     R26.2     Stiffness of left foot, not elsewhere classified     M25.675        Medical/Referring Diagnosis:  Left foot pain [M79.672]  Hammer toe of left foot [M20.42]  Acquired claw toe of left foot [M20.5X2]  Referring Physician:  Maulik Aguirre APRN - CNP  MD Orders:  PT Eval and Treat   Date of Onset:  L Foot Surgery 23 and 23  Allergies:   Fenofibrate micronized and Statins  Restrictions/Precautions:  No data recordedNo data recorded     Interventions Planned (Treatment may consist of any combination of the following):    Current Treatment Recommendations: Strengthening; ROM; Balance training; Gait training; Stair training; Manual; Pain management; Home exercise program; Patient/Caregiver education & training; Modalities       >Subjective Comments: \"I am getting there, it is going to take time the MD said\"     >Initial:   5   /10>Post Session:   5   /10  Medications Last Reviewed:  10/24/2023  Updated Objective Findings:   see below    10/19/23: Pt went to MD

## 2023-10-25 ENCOUNTER — HOSPITAL ENCOUNTER (OUTPATIENT)
Dept: PHYSICAL THERAPY | Age: 63
Setting detail: RECURRING SERIES
Discharge: HOME | End: 2023-10-28
Payer: MEDICARE

## 2023-10-25 PROCEDURE — 97140 MANUAL THERAPY 1/> REGIONS: CPT

## 2023-10-25 ASSESSMENT — PAIN SCALES - GENERAL: PAINLEVEL_OUTOF10: 4

## 2023-10-25 NOTE — PROGRESS NOTES
Date:  9/22 Date:  10/9 Date:  10/10   CM from base of heel Right Left Right Left Right Left Right Left Right Left   Foot  20 27.6 29    28.4   29    28    26.8    Ankle 10 25.9 29.9   29     29.3    29.7    29.3   Calf 30 33.1 40.3    36.1    35.4    36    35.6   Knee 50 39.8 42.5    41.9    40.9    40.7    39.4   Thigh                              Treatment   SELF CARE: (5 minutes):   Procedure(s) (per grid) utilized to improve and/or restore self-care/home management as related to  home management of edema/lymphedema . Required moderate verbal cueing to facilitate  education . Circumference measurements of left LE taken and skin assessed. Patient donned ankle compression sock independently. MANUAL THERAPY: (25 minutes):   Manual lymphatic drainage was utilized and necessary because of the patient's  left LE edema . Manual Lymph Drainage:   Lymph Nodes:    Cervical Supraclavicular Axillary Abdominal Inguinal Popliteal Antecubital   RIGHT []     []     []     []     []     []     []       LEFT []     []     []     []     [x]     [x]     []         Anastamoses:   Axillo-axillary Inguino-inguinal Axillo-inguinal Inguino-axillary   ANTERIOR []     []     []     []       POSTERIOR []     []     []     []       RIGHT []     []     []     []       LEFT []     []     []     []         Limbs:   []    RUE     []    LUE     []    RLE    [x]    LLE   Treatment/Session Summary:    >Treatment Assessment:    Noted stable circumference measurements except an increase at the left calf with ankle sock instead of knee sock on today; recommend consistent use of knee-high compression sock; patient agreeable.   Communication/Consultation:  None today  Equipment provided today:  None  Recommendations/Intent for next treatment session: Next visit will focus on complete decongestive therapy of left LE.    >Total Treatment Billable Duration: 30 minutes  OT Individual Minutes  Time In: 8775  Time Out: [address] [facility] Floor  Minutes: [address] [place]

## 2023-10-26 ENCOUNTER — HOSPITAL ENCOUNTER (OUTPATIENT)
Dept: PHYSICAL THERAPY | Age: 63
Setting detail: RECURRING SERIES
Discharge: HOME | End: 2023-10-29
Payer: COMMERCIAL

## 2023-10-26 PROCEDURE — 97140 MANUAL THERAPY 1/> REGIONS: CPT

## 2023-10-26 PROCEDURE — 97110 THERAPEUTIC EXERCISES: CPT

## 2023-10-26 NOTE — PROGRESS NOTES
Juan Baker III  : 1960  Primary: Medicare Part A ([place] [employer])  Secondary:  [facility] [address]  [address] [place], [place] [address]ice [facility] [address]  Phone: [phone]  Fax: [phone] Plan Frequency: 2x/wk for 90 days    Plan of Care/Certification Expiration Date: 24      >PT Visit Info:   Plan Frequency: 2x/wk for 90 days  Plan of Care/Certification Expiration Date: 24  Total # of Visits Approved: 30  Total # of Visits to Date: 7  Progress Note Counter: 7      Visit Count:  Visit count could not be calculated. Make sure you are using a visit which is associated with an episode.     OUTPATIENT PHYSICAL THERAPY:OP NOTE TYPE: OP Note Type: Treatment Note 10/26/2023       Episode  }Appt Desk             Treatment Diagnosis:    Visit Diagnoses           Codes     Pain in left ankle and joints of left foot    -  Primary M25.572     Difficulty in walking, not elsewhere classified     R26.2     Stiffness of left foot, not elsewhere classified     M25.675        Medical/Referring Diagnosis:  Left foot pain [M79.672]  Hammer toe of left foot [M20.42]  Acquired claw toe of left foot [M20.5X2]  Referring Physician:  Emma Giron APRN - CNP MD Orders:  PT Eval and Treat   Date of Onset:  L Foot Surgery 23 and 23  Allergies:   Fenofibrate micronized and Statins  Restrictions/Precautions:  No data recordedNo data recorded     Interventions Planned (Treatment may consist of any combination of the following):    Current Treatment Recommendations: Strengthening; ROM; Balance training; Gait training; Stair training; Manual; Pain management; Home exercise program; Patient/Caregiver education & training; Modalities       >Subjective Comments: \"I did the Nu-step for 15 minutes this morning\"     >Initial:   5   /10>Post Session:   5   /10  Medications Last Reviewed:  10/26/2023  Updated Objective Findings:   see below    10/19/23: Pt went to MD since here

## 2023-10-31 ENCOUNTER — HOSPITAL ENCOUNTER (OUTPATIENT)
Dept: PHYSICAL THERAPY | Age: 63
Setting detail: RECURRING SERIES
Discharge: HOME | End: 2023-11-03
Payer: COMMERCIAL

## 2023-10-31 PROCEDURE — 97110 THERAPEUTIC EXERCISES: CPT

## 2023-10-31 PROCEDURE — 97140 MANUAL THERAPY 1/> REGIONS: CPT

## 2023-10-31 NOTE — PROGRESS NOTES
Ham Edilson GUZMAN  : 1960  Primary: Medicare Part A ([place] [employer])  Secondary:  [facility] [address]  [address] [place] [address]  Phone: [phone]  Fax: [phone] Plan Frequency: 2x/wk for 90 days    Plan of Care/Certification Expiration Date: 24      >PT Visit Info:   Plan Frequency: 2x/wk for 90 days  Plan of Care/Certification Expiration Date: 24  Total # of Visits Approved: 30  Total # of Visits to Date: 7  Progress Note Counter: 7      Visit Count:  Visit count could not be calculated. Make sure you are using a visit which is associated with an episode.     OUTPATIENT PHYSICAL THERAPY:OP NOTE TYPE: OP Note Type: Treatment Note 10/31/2023       Episode  }Appt Desk             Treatment Diagnosis:    Visit Diagnoses           Codes     Pain in left ankle and joints of left foot    -  Primary M25.572     Difficulty in walking, not elsewhere classified     R26.2     Stiffness of left foot, not elsewhere classified     M25.675        Medical/Referring Diagnosis:  Left foot pain [M79.672]  Hammer toe of left foot [M20.42]  Acquired claw toe of left foot [M20.5X2]  Referring Physician:  Francesco Chen, KATHI - CNP  MD Orders:  PT Eval and Treat   Date of Onset:  L Foot Surgery 23 and 23  Allergies:   Fenofibrate micronized and Statins  Restrictions/Precautions:  No data recordedNo data recorded     Interventions Planned (Treatment may consist of any combination of the following):    Current Treatment Recommendations: Strengthening; ROM; Balance training; Gait training; Stair training; Manual; Pain management; Home exercise program; Patient/Caregiver education & training; Modalities       >Subjective Comments: \"I did the Nu-step for 15 minutes this morning\"     >Initial:   5   /10>Post Session:   5   /10  Medications Last Reviewed:  10/31/2023  Updated Objective Findings:   see below    10/19/23: Pt went to MD since here

## 2023-11-07 ENCOUNTER — HOSPITAL ENCOUNTER (OUTPATIENT)
Dept: PHYSICAL THERAPY | Age: 63
Setting detail: RECURRING SERIES
Discharge: HOME | End: 2023-11-10
Payer: COMMERCIAL

## 2023-11-07 PROCEDURE — 97140 MANUAL THERAPY 1/> REGIONS: CPT

## 2023-11-07 PROCEDURE — 97110 THERAPEUTIC EXERCISES: CPT

## 2023-11-07 NOTE — PROGRESS NOTES
Bong Connolly III  : 1960  Primary: Bruno Navarro ([facility])  Secondary:  [facility] [place] [address] [facility]  [address] [place] [address]  Phone: [phone]  Fax: [phone] Plan Frequency: 2x/wk for 90 days    Plan of Care/Certification Expiration Date: 24      >PT Visit Info:   Plan Frequency: 2x/wk for 90 days  Plan of Care/Certification Expiration Date: 24  Total # of Visits Approved: 30  Total # of Visits to Date: 8  Progress Note Counter: 8      Visit Count:  Visit count could not be calculated. Make sure you are using a visit which is associated with an episode.     OUTPATIENT PHYSICAL THERAPY:OP NOTE TYPE: OP Note Type: Treatment Note 2023       Episode  }Appt Desk             Treatment Diagnosis:    Visit Diagnoses           Codes     Pain in left ankle and joints of left foot    -  Primary M25.572     Difficulty in walking, not elsewhere classified     R26.2     Stiffness of left foot, not elsewhere classified     M25.675        Medical/Referring Diagnosis:  Left foot pain [M79.672]  Hammer toe of left foot [M20.42]  Acquired claw toe of left foot [M20.5X2]  Referring Physician:  Elby Gitelman, Geary Sell, KATHI - CNP  MD Orders:  PT Eval and Treat   Date of Onset:  L Foot Surgery 23 and 23  Allergies:   Fenofibrate micronized and Statins  Restrictions/Precautions:  No data recordedNo data recorded     Interventions Planned (Treatment may consist of any combination of the following):    Current Treatment Recommendations: Strengthening; ROM; Balance training; Gait training; Stair training; Manual; Pain management; Home exercise program; Patient/Caregiver education & training; Modalities       >Subjective Comments: \"I still feel it in my ankle\"     >Initial:   5   /10>Post Session:   5   /10  Medications Last Reviewed:  2023  Updated Objective Findings:   see below    10/19/23: Pt went to MD since here last. MD said spot on toe is a

## 2023-11-14 ENCOUNTER — HOSPITAL ENCOUNTER (OUTPATIENT)
Dept: PHYSICAL THERAPY | Age: 63
Setting detail: RECURRING SERIES
Discharge: HOME | End: 2023-11-17
Payer: COMMERCIAL

## 2023-11-14 PROCEDURE — 97140 MANUAL THERAPY 1/> REGIONS: CPT

## 2023-11-14 PROCEDURE — 97110 THERAPEUTIC EXERCISES: CPT

## 2023-11-22 ENCOUNTER — HOSPITAL ENCOUNTER (OUTPATIENT)
Dept: PHYSICAL THERAPY | Age: 63
Setting detail: RECURRING SERIES
Discharge: HOME | End: 2023-11-25
Payer: COMMERCIAL

## 2023-11-22 PROCEDURE — 97110 THERAPEUTIC EXERCISES: CPT

## 2023-11-22 PROCEDURE — 97140 MANUAL THERAPY 1/> REGIONS: CPT

## 2023-11-22 NOTE — PROGRESS NOTES
Samuel Alt III  : 1960  Primary: Piyush Navarro ([facility])  Secondary:  [address] [place]  [address] [facility] [address]  [address] [place] [address]  Phone: [phone]  Fax: [phone] Plan Frequency: 2x/wk for 90 days    Plan of Care/Certification Expiration Date: 24      >PT Visit Info:   Plan Frequency: 2x/wk for 90 days  Plan of Care/Certification Expiration Date: 24  Total # of Visits Approved: 30  Total # of Visits to Date: 12  Progress Note Counter: 1      Visit Count:  12   OUTPATIENT PHYSICAL THERAPY:OP NOTE TYPE: OP Note Type: Treatment Note 2023       Episode  }Appt Desk             Treatment Diagnosis:    Visit Diagnoses           Codes     Pain in left ankle and joints of left foot    -  Primary M25.572     Difficulty in walking, not elsewhere classified     R26.2     Stiffness of left foot, not elsewhere classified     M25.675        Medical/Referring Diagnosis:  Left foot pain [M79.672]  Hammer toe of left foot [M20.42]  Acquired claw toe of left foot [M20.5X2]  Referring Physician:  Roxy Jain, APRN - CNP  MD Orders:  PT Eval and Treat   Date of Onset:  L Foot Surgery 23 and 23  Allergies:   Fenofibrate micronized and Statins  Restrictions/Precautions:  No data recordedNo data recorded     Interventions Planned (Treatment may consist of any combination of the following):    Current Treatment Recommendations: Strengthening; ROM; Balance training; Gait training; Stair training; Manual; Pain management; Home exercise program; Patient/Caregiver education & training; Modalities       >Subjective Comments:   Pt reports continued pain and \"locking\" in the L foot. He states he wishes he had not had the surgery done.   >Initial:   5   /10>Post Session:   5   /10  Medications Last Reviewed:  2023  Updated Objective Findings:   See Progress Note done today    10/19/23: Pt went to MD since here last. MD said spot on toe is a blood

## 2023-11-27 ENCOUNTER — HOSPITAL ENCOUNTER (OUTPATIENT)
Dept: PHYSICAL THERAPY | Age: 63
Setting detail: RECURRING SERIES
Discharge: HOME | End: 2023-11-30
Payer: COMMERCIAL

## 2023-11-27 PROCEDURE — 97110 THERAPEUTIC EXERCISES: CPT

## 2023-11-27 PROCEDURE — 97140 MANUAL THERAPY 1/> REGIONS: CPT

## 2023-11-27 NOTE — PROGRESS NOTES
and he must have hit foot on something. 10/17/23: On L foot, tip of 3rd toe above toenail, 0.5cm diameter area of darkened skin. Pt says didn't see it last night and doesn't remember bumping foot on anything but said he did wear L foot brace with different shoe yesterday and hadn't worn it in a long time. No tenderness or draining. Pt took a picture of toe. Discussed with pt to watch it and to show MD at appointment in the morning. Treatment   THERAPEUTIC EXERCISE: (20 minutes):    Exercises per grid below to improve mobility and strength. Required minimal verbal cues to promote proper body alignment and promote proper body posture. Progressed resistance and repetitions as indicated. MANUAL THERAPY: (25 minutes): Soft tissue mobilization was utilized and necessary because of the patient's restricted motion of soft tissue. To L achilles tendon and plantar fascia, and L foot to decrease edema and tissue mobility. Date   11/14/23 Date:  11-22-23 Date:  11-27-23   Activity/Exercise      AROM L Ankle Dorsiflexion, Plantarflexion 20 reps  L LE     L Ankle Alphabet A-Z  1 rep  A-Z  1 rep   L Gastroc Stretch with Towel/Strap 3 reps  20 sec holds  3 reps  20 sec holds   Toe Scrunches with Towel 20 reps  L LE  20 reps  L LE   NuStep  To address muscular imbalance with progressive resistance  Advancement of increased activity tolerance X not today  Did at the gym this morning     Standing Balance Board-Forward/Backward 20 reps each 20 reps each 20 reps each   SLS L LE 5 reps to Fatigue 5 reps to Fatigue 5 reps to fatigue   1/2 foam step overs X 10 each le     Tandem stance Both positions. Too fatigue  Semi-Tandem Stance on Blue foams  Eyes Open and Closed   Theraband Ankle Dorsiflexion and Plantarflexion Green T-band  20 reps each Green T-band  20 reps each Green T-band  20 reps each             Treatment/Session Summary:    >Treatment Assessment:    Pt tolerated treatments well today with no c/o.

## 2023-12-06 ENCOUNTER — HOSPITAL ENCOUNTER (OUTPATIENT)
Dept: PHYSICAL THERAPY | Age: 63
Setting detail: RECURRING SERIES
Discharge: HOME | End: 2023-12-09
Payer: COMMERCIAL

## 2023-12-06 PROCEDURE — 97110 THERAPEUTIC EXERCISES: CPT

## 2023-12-06 PROCEDURE — 97140 MANUAL THERAPY 1/> REGIONS: CPT

## 2023-12-06 ASSESSMENT — PAIN SCALES - GENERAL: PAINLEVEL_OUTOF10: 5

## 2023-12-06 NOTE — PROGRESS NOTES
Rebekah Steven III  : 1960  Primary: Trent Madrigal Sc ([facility])  Secondary:  [address] [place]  [address] [facility] [address]  [address] [facility] [address]  Phone: [phone]  Fax: [phone] Plan Frequency: 2x/wk for 90 days    Plan of Care/Certification Expiration Date: 24      >PT Visit Info:   Plan Frequency: 2x/wk for 90 days  Plan of Care/Certification Expiration Date: 24  Total # of Visits Approved: 30  Total # of Visits to Date: 10  Progress Note Counter: 10      Visit Count:  12   OUTPATIENT PHYSICAL THERAPY:OP NOTE TYPE: OP Note Type: Treatment Note 2023       Episode  }Appt Desk             Treatment Diagnosis:    Visit Diagnoses  - see below          Codes     Pain in left ankle and joints of left foot    -  Primary M25.572     Difficulty in walking, not elsewhere classified     R26.2     Stiffness of left foot, not elsewhere classified     M25.675        Medical/Referring Diagnosis:  Left foot pain [M79.672]  Hammer toe of left foot [M20.42]  Acquired claw toe of left foot [M20.5X2]  Referring Physician:  Candida Patino, APRN - CNP  MD Orders:  PT Eval and Treat   Date of Onset:  L Foot Surgery 23 and 23  Allergies:   Fenofibrate micronized and Statins  Restrictions/Precautions:  No data recordedNo data recorded     Interventions Planned (Treatment may consist of any combination of the following):    Current Treatment Recommendations: Strengthening; ROM; Balance training; Gait training; Stair training; Manual; Pain management; Home exercise program; Patient/Caregiver education & training; Modalities       >Subjective Comments:   Pt states, \"I am in more discomfort today\"  >Initial:   2  5/10>Post Session:   2  4/10  Medications Last Reviewed:  2023  Updated Objective Findings:   See Progress Note done today    10/19/23: Pt went to MD since here last. MD said spot on toe is a blood blister and he must have hit foot on something.

## 2023-12-11 ENCOUNTER — HOSPITAL ENCOUNTER (OUTPATIENT)
Dept: PHYSICAL THERAPY | Age: 63
Setting detail: RECURRING SERIES
Discharge: HOME | End: 2023-12-14
Payer: COMMERCIAL

## 2023-12-11 PROCEDURE — 97110 THERAPEUTIC EXERCISES: CPT

## 2023-12-11 PROCEDURE — 97140 MANUAL THERAPY 1/> REGIONS: CPT

## 2023-12-11 ASSESSMENT — PAIN SCALES - GENERAL: PAINLEVEL_OUTOF10: 2

## 2023-12-11 NOTE — PROGRESS NOTES
Bong Connolly III  : 1960  Primary: Bruno Moy Sc ([facility])  Secondary:  [address] @ [place] [id number] [place] [address]  [address] [facility] [address]  Phone: [phone]  Fax: [phone] Plan Frequency: 2x/wk for 90 days    Plan of Care/Certification Expiration Date: 24      >PT Visit Info:   Plan Frequency: 2x/wk for 90 days  Plan of Care/Certification Expiration Date: 24  Total # of Visits Approved: 30  Total # of Visits to Date: 11  Progress Note Counter: 11      Visit Count:  12   OUTPATIENT PHYSICAL THERAPY:OP NOTE TYPE: OP Note Type: Treatment Note 2023       Episode  }Appt Desk             Treatment Diagnosis:    Visit Diagnoses  - see below          Codes     Pain in left ankle and joints of left foot    -  Primary M25.572     Difficulty in walking, not elsewhere classified     R26.2     Stiffness of left foot, not elsewhere classified     M25.675        Medical/Referring Diagnosis:  Left foot pain [M79.672]  Hammer toe of left foot [M20.42]  Acquired claw toe of left foot [M20.5X2]  Referring Physician:  Elby Gitelman, Geary Sell, APRN - CNP  MD Orders:  PT Eval and Treat   Date of Onset:  L Foot Surgery 23 and 23  Allergies:   Fenofibrate micronized and Statins  Restrictions/Precautions:  No data recordedNo data recorded     Interventions Planned (Treatment may consist of any combination of the following):    Current Treatment Recommendations: Strengthening; ROM; Balance training; Gait training; Stair training; Manual; Pain management; Home exercise program; Patient/Caregiver education & training; Modalities       >Subjective Comments:   Pt states, \"I had laser 3 x's last week, it feels so much better\"  >Initial:   2  2/10>Post Session:   2  1/10  Medications Last Reviewed:  2023  Updated Objective Findings:   See Progress Note done today    10/19/23: Pt went to MD since here last. MD said spot on toe is a blood blister and he must have hit

## 2023-12-27 ENCOUNTER — HOSPITAL ENCOUNTER (OUTPATIENT)
Dept: PHYSICAL THERAPY | Age: 63
Setting detail: RECURRING SERIES
Discharge: HOME | End: 2023-12-30
Payer: COMMERCIAL

## 2023-12-27 PROCEDURE — 97110 THERAPEUTIC EXERCISES: CPT

## 2023-12-27 PROCEDURE — 97140 MANUAL THERAPY 1/> REGIONS: CPT

## 2023-12-27 NOTE — PROGRESS NOTES
minimal verbal cues to promote proper body alignment and promote proper body posture. Progressed resistance and repetitions as indicated. MANUAL THERAPY: (35 minutes): Soft tissue mobilization was utilized and necessary because of the patient's restricted motion of soft tissue. To L achilles tendon and plantar fascia, and L foot to decrease edema and tissue mobility. Date:  12-27-23   Activity/Exercise    AROM L Ankle Dorsiflexion, Plantarflexion    L Ankle Alphabet A-Z  1 rep   L Gastroc Stretch with Towel/Strap 3 reps  20 sec holds   Toe Scrunches with Towel 20 reps  L LE HEP   NuStep  To address muscular imbalance with progressive resistance  Advancement of increased activity tolerance X Patient does at the gym   Standing Balance Board-Forward/Backward 20 reps each   SLS L LE 5 reps to fatigue   1/2 foam step overs    Tandem stance Semi-Tandem Stance on Blue foams  Eyes Open and Closed   Theraband Ankle Dorsiflexion and Plantarflexion Green T-band  20 reps each           Treatment/Session Summary:    >Treatment Assessment: Last appointment today schuduled,  patient has follow up with MD on January 8th. Between getting laser and PT patiens foot is feeling better overall. Will wait to see what MD suggest.   Communication/Consultation:  None today  Equipment provided today:  None  Recommendations/Intent for next treatment session: Next visit will focus on progression of ROM/strengthening L ankle as tolerable, balance training, manual therapy.     >Total Treatment Billable Duration:  45 minutes  Time In: 0845  Time Out: 0930    IMSTI Webber PTA       Charge Capture  }Post Session Pain  PT Visit Info  [employer] Portal  MD Guidelines  Scanned Media  Benefits  MyChart    Future Appointments   Date Time Provider [facility] [address]   1/8/2024  9:00 AM Wily Willoughby, APRN - CNP [facility]

## 2024-01-05 RX ORDER — EZETIMIBE 10 MG/1
1 TABLET ORAL
COMMUNITY

## 2024-01-05 RX ORDER — COLCHICINE 0.6 MG/1
1 TABLET ORAL
COMMUNITY

## 2024-01-05 RX ORDER — FENOFIBRATE 145 MG/1
1 TABLET, COATED ORAL
COMMUNITY

## 2024-01-05 RX ORDER — LISINOPRIL 40 MG/1
1 TABLET ORAL
COMMUNITY

## 2024-01-05 RX ORDER — CHOLESTYRAMINE 4 G/9G
POWDER, FOR SUSPENSION ORAL
COMMUNITY
Start: 2023-10-02

## 2024-01-10 ENCOUNTER — OFFICE VISIT (OUTPATIENT)
Dept: ORTHOPEDIC SURGERY | Age: 64
End: 2024-01-10
Payer: COMMERCIAL

## 2024-01-10 DIAGNOSIS — M79.672 LEFT FOOT PAIN: Primary | ICD-10-CM

## 2024-01-10 DIAGNOSIS — M19.079 ARTHRITIS OF SUBTALAR JOINT: ICD-10-CM

## 2024-01-10 DIAGNOSIS — M20.42 HAMMER TOE OF LEFT FOOT: ICD-10-CM

## 2024-01-10 PROCEDURE — 99213 OFFICE O/P EST LOW 20 MIN: CPT | Performed by: ORTHOPAEDIC SURGERY

## 2024-01-10 PROCEDURE — 1123F ACP DISCUSS/DSCN MKR DOCD: CPT | Performed by: ORTHOPAEDIC SURGERY

## 2024-01-10 NOTE — PROGRESS NOTES
Name: Silvano Yee III  YOB: 1960  Gender: male  MRN: [id number]    Summary:   Left triple arthrodesis and revision hammertoe correction       CC: Follow-up (Summary: / /Left triple arthrodesis and claw toe corrections//)       HPI: Silvano Yee III is a 63 y.o. male who presents with Follow-up (Summary: / /Left triple arthrodesis and claw toe corrections//)  .  This patient presents to the office today with decreased pain and increased functionality of the foot.  Physical therapy helped a lot.    History was obtained by Patient     ROS/Meds/PSH/PMH/FH/SH: I personally reviewed the patients standard intake form.  Below are the pertinents    Tobacco:  reports that he quit smoking about 28 years ago. His smoking use included cigarettes. He has never used smokeless tobacco.  Diabetes: None      Physical Examination:  Exam of the left lower extremity shows less swelling and tenderness palpation good range of motion of the ankle.      Imaging:   Interpretation of imaging  Left foot XR: AP, Lateral, Oblique views     ICD-10-CM    1. Left foot pain  M79.672 XR FOOT LEFT (MIN 3 VIEWS)      2. Hammer toe of left foot  M20.42 XR FOOT LEFT (MIN 3 VIEWS)      3. Arthritis of subtalar joint  M19.079 XR FOOT LEFT (MIN 3 VIEWS)         Report: AP, lateral, oblique x-ray of the left foot demonstrates stable triple arthrodesis    Impression: Stable triple arthrodesis   DERRICK GOMEZ III, MD           Assessment:   Left stable triple arthrodesis and hammertoe corrections    Treatment Plan:   3 This is stable chronic illness/condition  Treatment at this time: Physical Therapy  Studies ordered: NO XR needed @ Next Visit    Weight-bearing status: WBAT        Return to work/work restrictions: none  No medications given

## 2024-02-26 ENCOUNTER — OFFICE VISIT (OUTPATIENT)
Dept: ORTHOPEDIC SURGERY | Age: 64
End: 2024-02-26

## 2024-02-26 DIAGNOSIS — M19.079 ANKLE ARTHRITIS: Primary | ICD-10-CM

## 2024-02-26 RX ORDER — MELOXICAM 15 MG/1
15 TABLET ORAL DAILY
Qty: 30 TABLET | Refills: 3 | Status: SHIPPED | OUTPATIENT
Start: 2024-02-26

## 2024-02-26 NOTE — PROGRESS NOTES
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 male who presents with Follow-up (Left foot)  .  This patient presents the office today with increased complaints of pain located in his ankle joint with stiffness with a history of triple arthrodesis performed about a year and a half ago.    History was obtained by Patient     ROS/Meds/PSH/PMH/FH/SH: I personally reviewed the patients standard intake form.  Below are the pertinents    Tobacco:  reports that he quit smoking about 28 years ago. His smoking use included cigarettes. He has never used smokeless tobacco.  Diabetes: None      Physical Examination:    Exam of the left foot and ankle shows well-healed surgical incisions.  He does have some limited range of motion of the tibiotalar joint with crepitus particular on the lateral side.  He has expected stiffness in the hindfoot joint secondary to triple arthrodesis.  No sign of infection is noted.    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 After sterile prep of the area, the Left ankle joint was injected with 3 cc. of Xylocaine and 3 cc. of steroid.. 40mg Depo Medrol was the steroid used.  Patient tolerated it well.  I discussed the risk of infection and skin blanching.  I told the be patient be careful about the symptoms of hyperglycemia such as GI distress, polyuria, excessive thirst and lethargy.  If these symptoms occur they

## 2024-04-10 ENCOUNTER — OFFICE VISIT (OUTPATIENT)
Dept: ORTHOPEDIC SURGERY | Age: 64
End: 2024-04-10
Payer: COMMERCIAL

## 2024-04-10 DIAGNOSIS — M19.079 ARTHRITIS OF ANKLE JOINT, UNSPECIFIED LATERALITY: ICD-10-CM

## 2024-04-10 DIAGNOSIS — M25.572 CHRONIC PAIN OF LEFT ANKLE: ICD-10-CM

## 2024-04-10 DIAGNOSIS — E11.610 CHARCOT FOOT DUE TO DIABETES MELLITUS (HCC): ICD-10-CM

## 2024-04-10 DIAGNOSIS — M76.822 TIBIALIS TENDINITIS OF BOTH LOWER EXTREMITIES: ICD-10-CM

## 2024-04-10 DIAGNOSIS — G89.29 CHRONIC PAIN OF LEFT ANKLE: ICD-10-CM

## 2024-04-10 DIAGNOSIS — M76.822 TIBIALIS TENDINITIS OF LEFT LOWER EXTREMITY: ICD-10-CM

## 2024-04-10 DIAGNOSIS — M79.672 LEFT FOOT PAIN: Primary | ICD-10-CM

## 2024-04-10 DIAGNOSIS — M76.821 TIBIALIS TENDINITIS OF BOTH LOWER EXTREMITIES: ICD-10-CM

## 2024-04-10 DIAGNOSIS — T85.848D PAIN FROM IMPLANTED HARDWARE, SUBSEQUENT ENCOUNTER: ICD-10-CM

## 2024-04-10 PROCEDURE — 99214 OFFICE O/P EST MOD 30 MIN: CPT | Performed by: ORTHOPAEDIC SURGERY

## 2024-04-10 PROCEDURE — 1123F ACP DISCUSS/DSCN MKR DOCD: CPT | Performed by: ORTHOPAEDIC SURGERY

## 2024-04-10 NOTE — PROGRESS NOTES
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  This patient presents back to the office today with increasing complaints in his posterior aspect of his left heel.  He is tried some gel heel cups for this got much benefit.    History was obtained by Patient     ROS/Meds/PSH/PMH/FH/SH: I personally reviewed the patients standard intake form.  Below are the pertinents    Tobacco:  reports that he quit smoking about 28 years ago. His smoking use included cigarettes. He has never used smokeless tobacco.  Diabetes: None      Physical Examination:    Exam of the left lower extremity shows increasing tenderness to palpation over the insertion of the screw in the calcaneus.  There is no sign of infection noted.    Imaging:   Interpretation of imaging  Left foot XR: AP, Lateral, Oblique views     ICD-10-CM    1. Left foot pain  M79.672 XR FOOT LEFT (MIN 3 VIEWS)      2. Pain from implanted hardware, subsequent encounter  T85.848D          Report: AP, lateral, oblique x-ray of the left demonstrates stable x-rays with no additional hardware failure    Impression: Stable x-rays with no additional hardware failure   DERRICK GOMEZ III, MD           Assessment:   Left calcaneus hardware pain    Treatment Plan:   4 This is a chronic illness/condition with exacerbation and progression  Treatment at this time: Elective major surgery with procedural risk factors  Studies ordered: NO XR needed @ Next Visit    Weight-bearing status: WBAT        Return to work/work restrictions: none  No medications given    We

## 2024-04-11 ENCOUNTER — CLINICAL DOCUMENTATION (OUTPATIENT)
Dept: ORTHOPEDIC SURGERY | Age: 64
End: 2024-04-11

## 2024-04-11 PROBLEM — T85.848A PAIN FROM IMPLANTED HARDWARE: Status: ACTIVE | Noted: 2024-04-10

## 2024-04-22 NOTE — PERIOP NOTE
Patient verified name and .  Order for consent NOT found in EHR and matches case posting; patient verifies procedure.   Type 1B surgery, PHONE assessment complete.  Orders NOT received.  Labs per surgeon: NONE  Labs per anesthesia protocol: SQBS (DOS), POTASSIUM (DOS)    Patient answered medical/surgical history questions at their best of ability. All prior to admission medications documented in EPIC.    Patient instructed to continue taking all prescription medications up to the day of surgery but to take only the following medications the day of surgery according to anesthesia guidelines with a small sip of water:     Gabapentin  Metoprolol  Amlodipine  Allopurinol  Omeprazole       Also, patient is requested to take 2 Tylenol in the morning and then again before bed on the day before surgery. Regular or extra strength may be used.       Patient informed that all vitamins and supplements should be held 7 days prior to surgery and NSAIDS 5 days prior to surgery. Prescription meds to hold:      Metformin (Glucophage), Icosapent Ethyl (Vascepa), Lisinopril (Zestril), HCTZ- Do not take day of surgery.  Meloxicam- Please stop taking as of now.     Patient instructed on the following:    > Arrive at [facility]PC Entrance, time of arrival to be called the day before by 1700  > NPO after midnight, unless otherwise indicated, including gum, mints, and ice chips  > Responsible adult must drive patient to the hospital, stay during surgery, and patient will need supervision 24 hours after anesthesia  > Use non moisturizing soap in shower the night before surgery and on the morning of surgery  > All piercings must be removed prior to arrival.    > Leave all valuables (money and jewelry) at home but bring insurance card and ID on DOS.   > You may be required to pay a deductible or co-pay on the day of your procedure. You can pre-pay by calling [phone] if your surgery is at the [facility] or [phone] if your surgery is at the

## 2024-04-24 ENCOUNTER — ANESTHESIA EVENT (OUTPATIENT)
Dept: SURGERY | Age: 64
End: 2024-04-24
Payer: COMMERCIAL

## 2024-04-24 NOTE — PERIOP NOTE
Preop department called to notify patient of arrival time for scheduled procedure. Instructions given to   - Arrive at [facility] Entrance [address]  - Remain NPO after midnight, unless otherwise indicated, including gum, mints, and ice chips.   - Have a responsible adult to drive patient to the hospital, stay during surgery, and patient will need supervision 24 hours after anesthesia.   - Use antibacterial soap in shower the night before surgery and on the morning of surgery.       Was patient contacted: yes-pt  Voicemail left: n/a  Numbers contacted: [phone]   Arrival time: 0800

## 2024-04-25 ENCOUNTER — HOSPITAL ENCOUNTER (OUTPATIENT)
Age: 64
Setting detail: OUTPATIENT SURGERY
Discharge: HOME | End: 2024-04-25
Attending: ORTHOPAEDIC SURGERY | Admitting: ORTHOPAEDIC SURGERY
Payer: COMMERCIAL

## 2024-04-25 ENCOUNTER — ANESTHESIA (OUTPATIENT)
Dept: SURGERY | Age: 64
End: 2024-04-25
Payer: COMMERCIAL

## 2024-04-25 ENCOUNTER — APPOINTMENT (OUTPATIENT)
Dept: GENERAL RADIOLOGY | Age: 64
End: 2024-04-25
Attending: ORTHOPAEDIC SURGERY
Payer: COMMERCIAL

## 2024-04-25 VITALS
TEMPERATURE: 97.7 F | OXYGEN SATURATION: 96 % | WEIGHT: 235 LBS | HEIGHT: 76 IN | HEART RATE: 57 BPM | BODY MASS INDEX: 28.62 KG/M2 | DIASTOLIC BLOOD PRESSURE: 89 MMHG | SYSTOLIC BLOOD PRESSURE: 161 MMHG | RESPIRATION RATE: 18 BRPM

## 2024-04-25 LAB
GLUCOSE BLD STRIP.AUTO-MCNC: 91 MG/DL (ref 65–100)
POTASSIUM BLD-SCNC: 3.9 MMOL/L (ref 3.5–5.1)
SERVICE CMNT-IMP: NORMAL

## 2024-04-25 PROCEDURE — 6360000002 HC RX W HCPCS: Performed by: REGISTERED NURSE

## 2024-04-25 PROCEDURE — 7100000000 HC PACU RECOVERY - FIRST 15 MIN: Performed by: ORTHOPAEDIC SURGERY

## 2024-04-25 PROCEDURE — 2580000003 HC RX 258: Performed by: ANESTHESIOLOGY

## 2024-04-25 PROCEDURE — 6360000002 HC RX W HCPCS: Performed by: NURSE PRACTITIONER

## 2024-04-25 PROCEDURE — 3700000000 HC ANESTHESIA ATTENDED CARE: Performed by: ORTHOPAEDIC SURGERY

## 2024-04-25 PROCEDURE — 6360000002 HC RX W HCPCS: Performed by: ORTHOPAEDIC SURGERY

## 2024-04-25 PROCEDURE — 3600000002 HC SURGERY LEVEL 2 BASE: Performed by: ORTHOPAEDIC SURGERY

## 2024-04-25 PROCEDURE — 82962 GLUCOSE BLOOD TEST: CPT

## 2024-04-25 PROCEDURE — C1713 ANCHOR/SCREW BN/BN,TIS/BN: HCPCS | Performed by: ORTHOPAEDIC SURGERY

## 2024-04-25 PROCEDURE — 2500000003 HC RX 250 WO HCPCS: Performed by: REGISTERED NURSE

## 2024-04-25 PROCEDURE — 7100000010 HC PHASE II RECOVERY - FIRST 15 MIN: Performed by: ORTHOPAEDIC SURGERY

## 2024-04-25 PROCEDURE — 2709999900 HC NON-CHARGEABLE SUPPLY: Performed by: ORTHOPAEDIC SURGERY

## 2024-04-25 PROCEDURE — 3700000001 HC ADD 15 MINUTES (ANESTHESIA): Performed by: ORTHOPAEDIC SURGERY

## 2024-04-25 PROCEDURE — 84132 ASSAY OF SERUM POTASSIUM: CPT

## 2024-04-25 PROCEDURE — 3600000012 HC SURGERY LEVEL 2 ADDTL 15MIN: Performed by: ORTHOPAEDIC SURGERY

## 2024-04-25 PROCEDURE — 7100000011 HC PHASE II RECOVERY - ADDTL 15 MIN: Performed by: ORTHOPAEDIC SURGERY

## 2024-04-25 DEVICE — IMPLANTABLE DEVICE: Type: IMPLANTABLE DEVICE | Site: FOOT | Status: FUNCTIONAL

## 2024-04-25 RX ORDER — LIDOCAINE HYDROCHLORIDE 20 MG/ML
INJECTION, SOLUTION EPIDURAL; INFILTRATION; INTRACAUDAL; PERINEURAL PRN
Status: DISCONTINUED | OUTPATIENT
Start: 2024-04-25 | End: 2024-04-25 | Stop reason: SDUPTHER

## 2024-04-25 RX ORDER — PROCHLORPERAZINE EDISYLATE 5 MG/ML
5 INJECTION INTRAMUSCULAR; INTRAVENOUS
Status: DISCONTINUED | OUTPATIENT
Start: 2024-04-25 | End: 2024-04-25 | Stop reason: HOSPADM

## 2024-04-25 RX ORDER — SODIUM CHLORIDE 0.9 % (FLUSH) 0.9 %
5-40 SYRINGE (ML) INJECTION EVERY 12 HOURS SCHEDULED
Status: DISCONTINUED | OUTPATIENT
Start: 2024-04-25 | End: 2024-04-25 | Stop reason: HOSPADM

## 2024-04-25 RX ORDER — NALOXONE HYDROCHLORIDE 0.4 MG/ML
INJECTION, SOLUTION INTRAMUSCULAR; INTRAVENOUS; SUBCUTANEOUS PRN
Status: DISCONTINUED | OUTPATIENT
Start: 2024-04-25 | End: 2024-04-25 | Stop reason: HOSPADM

## 2024-04-25 RX ORDER — PROPOFOL 10 MG/ML
INJECTION, EMULSION INTRAVENOUS PRN
Status: DISCONTINUED | OUTPATIENT
Start: 2024-04-25 | End: 2024-04-25 | Stop reason: SDUPTHER

## 2024-04-25 RX ORDER — OXYCODONE HYDROCHLORIDE 5 MG/1
5 TABLET ORAL
Status: DISCONTINUED | OUTPATIENT
Start: 2024-04-25 | End: 2024-04-25 | Stop reason: HOSPADM

## 2024-04-25 RX ORDER — MIDAZOLAM HYDROCHLORIDE 2 MG/2ML
2 INJECTION, SOLUTION INTRAMUSCULAR; INTRAVENOUS
Status: DISCONTINUED | OUTPATIENT
Start: 2024-04-25 | End: 2024-04-25 | Stop reason: HOSPADM

## 2024-04-25 RX ORDER — BUPIVACAINE HYDROCHLORIDE 5 MG/ML
INJECTION, SOLUTION EPIDURAL; INTRACAUDAL PRN
Status: DISCONTINUED | OUTPATIENT
Start: 2024-04-25 | End: 2024-04-25 | Stop reason: ALTCHOICE

## 2024-04-25 RX ORDER — SODIUM CHLORIDE 0.9 % (FLUSH) 0.9 %
5-40 SYRINGE (ML) INJECTION PRN
Status: DISCONTINUED | OUTPATIENT
Start: 2024-04-25 | End: 2024-04-25 | Stop reason: HOSPADM

## 2024-04-25 RX ORDER — SODIUM CHLORIDE 9 MG/ML
INJECTION, SOLUTION INTRAVENOUS PRN
Status: DISCONTINUED | OUTPATIENT
Start: 2024-04-25 | End: 2024-04-25 | Stop reason: HOSPADM

## 2024-04-25 RX ORDER — SODIUM CHLORIDE, SODIUM LACTATE, POTASSIUM CHLORIDE, CALCIUM CHLORIDE 600; 310; 30; 20 MG/100ML; MG/100ML; MG/100ML; MG/100ML
INJECTION, SOLUTION INTRAVENOUS CONTINUOUS
Status: DISCONTINUED | OUTPATIENT
Start: 2024-04-25 | End: 2024-04-25 | Stop reason: HOSPADM

## 2024-04-25 RX ORDER — HYDROMORPHONE HYDROCHLORIDE 2 MG/ML
0.5 INJECTION, SOLUTION INTRAMUSCULAR; INTRAVENOUS; SUBCUTANEOUS EVERY 5 MIN PRN
Status: DISCONTINUED | OUTPATIENT
Start: 2024-04-25 | End: 2024-04-25 | Stop reason: HOSPADM

## 2024-04-25 RX ORDER — FENTANYL CITRATE 50 UG/ML
100 INJECTION, SOLUTION INTRAMUSCULAR; INTRAVENOUS
Status: DISCONTINUED | OUTPATIENT
Start: 2024-04-25 | End: 2024-04-25 | Stop reason: HOSPADM

## 2024-04-25 RX ORDER — LIDOCAINE HYDROCHLORIDE 10 MG/ML
1 INJECTION, SOLUTION INFILTRATION; PERINEURAL
Status: DISCONTINUED | OUTPATIENT
Start: 2024-04-25 | End: 2024-04-25 | Stop reason: HOSPADM

## 2024-04-25 RX ADMIN — PROPOFOL 50 MG: 10 INJECTION, EMULSION INTRAVENOUS at 08:53

## 2024-04-25 RX ADMIN — Medication 2000 MG: at 08:50

## 2024-04-25 RX ADMIN — LIDOCAINE HYDROCHLORIDE 40 MG: 20 INJECTION, SOLUTION EPIDURAL; INFILTRATION; INTRACAUDAL; PERINEURAL at 08:53

## 2024-04-25 RX ADMIN — SODIUM CHLORIDE, POTASSIUM CHLORIDE, SODIUM LACTATE AND CALCIUM CHLORIDE: 600; 310; 30; 20 INJECTION, SOLUTION INTRAVENOUS at 08:25

## 2024-04-25 RX ADMIN — PROPOFOL 100 MCG/KG/MIN: 10 INJECTION, EMULSION INTRAVENOUS at 08:54

## 2024-04-25 ASSESSMENT — PAIN SCALES - GENERAL
PAINLEVEL_OUTOF10: 0

## 2024-04-25 ASSESSMENT — PAIN - FUNCTIONAL ASSESSMENT: PAIN_FUNCTIONAL_ASSESSMENT: 0-10

## 2024-04-25 NOTE — ANESTHESIA POSTPROCEDURE EVALUATION
Department of Anesthesiology  Postprocedure Note    Patient: Silvano Yee III  MRN: [id number]  YOB: 1960  Date of evaluation: 4/25/2024    Procedure Summary       Date: 04/25/24 Room / Location:  OP OR 01 / [facility]    Anesthesia Start: 0848 Anesthesia Stop: 0914    Procedure: Left calcaneus Hardware removal (Left: Foot) Diagnosis:       Pain from implanted hardware      (Pain from implanted hardware [T85.848A])    Surgeons: Jose Vincent III, MD Responsible Provider: Chandana Oleary MD    Anesthesia Type: TIVA ASA Status: 2            Anesthesia Type: No value filed.    Farheen Phase I: Farheen Score: 8    Farheen Phase II: Farheen Score: 10    Anesthesia Post Evaluation    Patient location during evaluation: PACU  Patient participation: complete - patient participated  Level of consciousness: awake and alert  Airway patency: patent  Nausea & Vomiting: no nausea and no vomiting  Cardiovascular status: hemodynamically stable  Respiratory status: acceptable, nonlabored ventilation and spontaneous ventilation  Hydration status: euvolemic  Comments: BP (!) 161/89   Pulse 57   Temp 97.7 °F (36.5 °C) (Temporal)   Resp 18   Ht 1.93 m (6' 4\")   Wt 106.6 kg (235 lb)   SpO2 96%   BMI 28.61 kg/m²     Multimodal analgesia pain management approach  Pain management: adequate and satisfactory to patient    No notable events documented.

## 2024-04-25 NOTE — OP NOTE
identified and removed without difficulty.  The wounds and irrigated and closed using nylon sutures.  Sterile dressings applied.  Anesthesia was discontinued.  The patient was transferred back to recovery bed.  Was taken recovery in satisfactory condition.  Appeared tolerate the procedure well.  There were no apparent surgical or anesthetic complications.  All needle sponge counts were correct.    A sterile dressing was then applied to the leg and soft dressing.  They were awoken from anesthesia and returned to the PACU without difficulty.    Post Operative Plan:   1- WB status: As tolerated   2- Immobilization/assistive devices: crutches  3- DVT px: No VTE Prophylaxis Needed

## 2024-04-25 NOTE — DISCHARGE INSTRUCTIONS
surgery, it is one of the body's many ways to respond to stress.  If temperatures reach above 101.5 °F please call the office for further instructions.    Minimizing activity levels is highly recommended for the first 48 to 72 hours after surgery.    Elevation of the operative extremity is imperative after surgery.  This will help to decrease swelling, can stop any additional bleeding post surgery and can decrease overall discomfort.  Swelling is a normal finding after surgery and can sometimes be severe elevation is the best and effective way to relieve this.    Please be mindful that the most effective way to elevate the extremity after surgery is at the patient's heart level or above.    If you have any questions or concerns regarding your surgery or recovery please refer back to the commonly asked questions and answer sheet you received at our office before surgery, there are a lot of details within this packet that can be helpful even days after surgery.  If you do not find an answer within this packet please call our office at [phone] or you may send a message through [employer], these messages are reviewed and answered throughout our clinic days Monday through Friday.

## 2024-04-25 NOTE — ANESTHESIA PRE PROCEDURE
COVID-19 vaccine series completed 2021    Moderna: 2nd dose 21- bring vaccination card DOS   • Diabetes (HCC)     Type 2. oral medication, does not check BS, denies s.s. of hypoglycemia, last A1C = 5.6   • GERD (gastroesophageal reflux disease)     omeprazole, well controlled   • History of basal cell cancer    • Hypercholesteremia     managed with med   • Hypertension     medication   • Idiopathic chronic gout of multiple sites without tophus    • Leucopenia    • Medial epicondylitis    • Mitral insufficiency    • Neuropathy    • Plantar fasciitis, bilateral    • RA (rheumatoid arthritis) (HCC)     managed with med   • Thumb tendonitis    • Ventral hernia without obstruction or gangrene        Past Surgical History:        Procedure Laterality Date   • ACHILLES TENDON SURGERY Left 2023    ACHILLES TENDON LENGTHENING REPAIR performed by Jose Vincent III, MD at [facility]   • ARTHRODESIS Left 2023    left triple arthrodesis performed by Jose Vincent III, MD at [facility]   • COLONOSCOPY     • ENDOSCOPY, COLON, DIAGNOSTIC     • HAMMER TOE SURGERY Left 2023    left second, third and fourth distal interphalangeal joint resection arthroplasties CPT code 28285 x 3 performed by Jose Vincent III, MD at [facility]   • HAMMER TOE SURGERY Left 2023    Left revision second proximal interphalangeal resection arthroplasty performed by Jose Vincent III, MD at [facility]   • LUMBAR LAMINECTOMY     • ORTHOPEDIC SURGERY Right    • ORTHOPEDIC SURGERY Right 2019    2cd toe hammer toe last yr   • TONSILLECTOMY  age 2       Social History:    Social History     Tobacco Use   • Smoking status: Former     Current packs/day: 0.00     Types: Cigarettes     Quit date: 1995     Years since quittin.3   • Smokeless tobacco: Never   Substance Use Topics   • Alcohol use: Yes     Alcohol/week: 4.0 standard drinks of alcohol     Comment: social/ocassional                                Counseling given: Not

## 2024-04-25 NOTE — H&P
caps in pm 5/5/21   Automatic Reconciliation, Ar   metFORMIN (GLUCOPHAGE) 500 MG tablet Take 1 tablet by mouth 2 times daily (with meals)    Automatic Reconciliation, Ar   metoprolol tartrate (LOPRESSOR) 25 MG tablet Take 0.5 tablets by mouth 2 times daily    Automatic Reconciliation, Ar       The surgery is planned for the Left calcaneus Hardware removal .        History and physical has been reviewed. The patient has been examined. There have been no significant clinical changes since the completion of the originally dated History and Physical.  Patient identified by surgeon; surgical site was confirmed by patient and surgeon.      The patient is here today for outpatient surgery. I have examined the patient, no changes are noted in the patient's medical status. Necessity for the procedure/care is still present and the history and physical above is current.  See the office notes for the full long term history of the problem.  Please see the recent office notes for the musculoskeletal examination.    Signed By: KATHI Amos - GENA     April 25, 2024 8:34 AM

## 2024-05-08 ENCOUNTER — OFFICE VISIT (OUTPATIENT)
Dept: ORTHOPEDIC SURGERY | Age: 64
End: 2024-05-08

## 2024-05-08 DIAGNOSIS — T85.848D PAIN FROM IMPLANTED HARDWARE, SUBSEQUENT ENCOUNTER: Primary | ICD-10-CM

## 2024-05-08 PROCEDURE — 99024 POSTOP FOLLOW-UP VISIT: CPT | Performed by: NURSE PRACTITIONER

## 2024-05-24 ENCOUNTER — OFFICE VISIT (OUTPATIENT)
Dept: ORTHOPEDIC SURGERY | Age: 64
End: 2024-05-24

## 2024-05-24 DIAGNOSIS — M19.072 ARTHRITIS OF LEFT ANKLE: Primary | ICD-10-CM

## 2024-05-24 NOTE — PROGRESS NOTES
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 male who presents with Follow-up and Post-Op Check (Left calcaneus hardware removal DOS: 4/25/24/Left ankle pain)  .  This patient returns back to the office today with continued complaints of left arthritic ankle pain after triple arthrodesis.  Is also been seeing Dr. Cheatham which is great and have discussed potential peripheral nerve stimulator.    History was obtained by Patient     ROS/Meds/PSH/PMH/FH/SH: I personally reviewed the patients standard intake form.  Below are the pertinents    Tobacco:  reports that he quit smoking about 28 years ago. His smoking use included cigarettes. He has never used smokeless tobacco.  Diabetes: None      Physical Examination:    Exam of the left lower extremity shows a planovalgus hindfoot with a solid subtalar joint arthrodesis.  Incisions are well-healed in the calcaneus removal of hardware.    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 After sterile prep of the area, the Left ankle joint was injected with 3 cc. of Xylocaine and 3 cc. of steroid.. 40mg Depo Medrol was the steroid used.  Patient tolerated it well.  I discussed the risk of infection and skin blanching.  I told the be patient be careful about the symptoms of hyperglycemia such as GI distress, polyuria, excessive thirst and lethargy.  If these symptoms occur they should present to an primary care doctor

## 2024-06-10 ENCOUNTER — TELEPHONE (OUTPATIENT)
Dept: ORTHOPEDIC SURGERY | Age: 64
End: 2024-06-10

## 2024-06-17 RX ORDER — MELOXICAM 15 MG/1
15 TABLET ORAL DAILY
Qty: 30 TABLET | Refills: 0 | OUTPATIENT
Start: 2024-06-17

## 2024-06-20 RX ORDER — MELOXICAM 15 MG/1
15 TABLET ORAL DAILY
Qty: 30 TABLET | Refills: 3 | OUTPATIENT
Start: 2024-06-20

## 2024-06-27 DIAGNOSIS — M79.672 LEFT FOOT PAIN: ICD-10-CM

## 2024-06-27 DIAGNOSIS — T85.848D PAIN FROM IMPLANTED HARDWARE, SUBSEQUENT ENCOUNTER: ICD-10-CM

## 2024-06-27 DIAGNOSIS — M19.072 ARTHRITIS OF LEFT ANKLE: Primary | ICD-10-CM

## 2024-06-27 RX ORDER — MELOXICAM 15 MG/1
15 TABLET ORAL DAILY
Qty: 30 TABLET | Refills: 3 | Status: SHIPPED | OUTPATIENT
Start: 2024-06-27

## 2024-08-22 RX ORDER — FLUOROURACIL 50 MG/G
CREAM TOPICAL
COMMUNITY
Start: 2024-05-20

## 2024-08-26 ENCOUNTER — OFFICE VISIT (OUTPATIENT)
Dept: ORTHOPEDIC SURGERY | Age: 64
End: 2024-08-26

## 2024-08-26 DIAGNOSIS — M19.072 ARTHRITIS OF LEFT ANKLE: Primary | ICD-10-CM

## 2024-08-26 NOTE — PROGRESS NOTES
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 )       HPI: Silvano Yee III is a 64 y.o. 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 )  .  This patient presents back to the office today with continued complaints of left ankle pain after his history of triple arthrodesis.  He is in pain management now.    History was obtained by Patient     ROS/Meds/PSH/PMH/FH/SH: I personally reviewed the patients standard intake form.  Below are the pertinents    Tobacco:  reports that he quit smoking about 28 years ago. His smoking use included cigarettes. He has never used smokeless tobacco.  Diabetes: None      Physical Examination:  Exam of the left lower extremity shows limited range of motion tibiotalar joint with well-healed surgical incisions and expected stiffness in the subtalar joint.      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 After sterile prep of the area, the Left ankle joint was injected with 3 cc. of Xylocaine and 3 cc. of steroid.. 40mg Depo Medrol was the steroid used.  Patient tolerated it well.  I discussed the risk of infection and skin blanching.  I told the be patient be careful about the symptoms of hyperglycemia such as GI distress, polyuria, excessive thirst and lethargy.  If these symptoms occur they should present to an primary care doctor or urgent facility  Studies ordered: NO XR needed @ Next Visit    Weight-bearing

## 2024-10-21 DIAGNOSIS — G89.29 CHRONIC PAIN OF LEFT ANKLE: ICD-10-CM

## 2024-10-21 DIAGNOSIS — T85.848D PAIN FROM IMPLANTED HARDWARE, SUBSEQUENT ENCOUNTER: ICD-10-CM

## 2024-10-21 DIAGNOSIS — M25.572 CHRONIC PAIN OF LEFT ANKLE: ICD-10-CM

## 2024-10-21 DIAGNOSIS — M79.672 LEFT FOOT PAIN: ICD-10-CM

## 2024-10-21 DIAGNOSIS — M76.822 TIBIALIS TENDINITIS OF LEFT LOWER EXTREMITY: ICD-10-CM

## 2024-10-21 DIAGNOSIS — M19.072 ARTHRITIS OF LEFT ANKLE: Primary | ICD-10-CM

## 2024-10-21 RX ORDER — MELOXICAM 15 MG/1
15 TABLET ORAL DAILY
Qty: 30 TABLET | Refills: 3 | Status: SHIPPED | OUTPATIENT
Start: 2024-10-21

## 2024-12-02 ENCOUNTER — OFFICE VISIT (OUTPATIENT)
Dept: ORTHOPEDIC SURGERY | Age: 64
End: 2024-12-02

## 2024-12-02 DIAGNOSIS — M25.572 CHRONIC PAIN OF LEFT ANKLE: ICD-10-CM

## 2024-12-02 DIAGNOSIS — M19.072 ARTHRITIS OF LEFT ANKLE: Primary | ICD-10-CM

## 2024-12-02 DIAGNOSIS — G89.29 CHRONIC PAIN OF LEFT ANKLE: ICD-10-CM

## 2024-12-02 RX ORDER — METHYLPREDNISOLONE ACETATE 40 MG/ML
40 INJECTION, SUSPENSION INTRA-ARTICULAR; INTRALESIONAL; INTRAMUSCULAR; SOFT TISSUE ONCE
Status: COMPLETED | OUTPATIENT
Start: 2024-12-02 | End: 2024-12-02

## 2024-12-02 RX ADMIN — METHYLPREDNISOLONE ACETATE 40 MG: 40 INJECTION, SUSPENSION INTRA-ARTICULAR; INTRALESIONAL; INTRAMUSCULAR; SOFT TISSUE at 09:09

## 2024-12-09 ENCOUNTER — OFFICE VISIT (OUTPATIENT)
Dept: ORTHOPEDIC SURGERY | Age: 64
End: 2024-12-09

## 2024-12-09 DIAGNOSIS — M25.572 CHRONIC PAIN OF LEFT ANKLE: ICD-10-CM

## 2024-12-09 DIAGNOSIS — G89.29 CHRONIC PAIN OF LEFT ANKLE: ICD-10-CM

## 2024-12-09 DIAGNOSIS — M19.072 ARTHRITIS OF LEFT ANKLE: Primary | ICD-10-CM

## 2024-12-09 RX ORDER — METHYLPREDNISOLONE ACETATE 40 MG/ML
40 INJECTION, SUSPENSION INTRA-ARTICULAR; INTRALESIONAL; INTRAMUSCULAR; SOFT TISSUE ONCE
Status: COMPLETED | OUTPATIENT
Start: 2024-12-09 | End: 2024-12-09

## 2024-12-09 RX ADMIN — METHYLPREDNISOLONE ACETATE 40 MG: 40 INJECTION, SUSPENSION INTRA-ARTICULAR; INTRALESIONAL; INTRAMUSCULAR; SOFT TISSUE at 09:05

## 2024-12-09 NOTE — PROGRESS NOTES
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 male who presents with Follow-up (Left ankle pain, wants injection)  .  This patient presents back to the office today with continued plaints of left ankle pain.  He is undergoing evaluation right now for potential spinal cord stimulator.    History was obtained by Patient this    ROS/Meds/PSH/PMH/FH/SH: I personally reviewed the patients standard intake form.  Below are the pertinents    Tobacco:  reports that he quit smoking about 29 years ago. His smoking use included cigarettes. He has never used smokeless tobacco.  Diabetes: None      Physical Examination:  Exam of the left lower extremity shows continued limited tibiotalar joint range of motion expected stiffness in the sinus Tarsi.      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 After sterile prep of the area, the Left ankle joint was injected with 3 cc. of Xylocaine and 3 cc. of steroid.. 40mg Depo Medrol was the steroid used.  Patient tolerated it well.  I discussed the risk of infection and skin blanching.  I told the be patient be careful about the symptoms of hyperglycemia such as GI distress, polyuria, excessive thirst and lethargy.  If these symptoms occur they should present to an primary care doctor or urgent facility  Studies ordered: NO XR needed @ Next Visit    Weight-bearing status: WBAT        Return to work/work restrictions: none  No medications

## 2025-05-06 ENCOUNTER — PATIENT MESSAGE (OUTPATIENT)
Dept: ORTHOPEDIC SURGERY | Age: 65
End: 2025-05-06

## 2025-05-06 RX ORDER — MELOXICAM 15 MG/1
15 TABLET ORAL DAILY PRN
Qty: 30 TABLET | Refills: 0 | Status: SHIPPED | OUTPATIENT
Start: 2025-05-06

## 2025-07-22 NOTE — PROGRESS NOTES
Angelina Briscoe III  : 1960  Primary: Medicare Part A ([place])  Secondary:  [facility] [address] [facility] [address]  Phone: [phone]  Fax: [phone] Plan Frequency: 2x/wk for 90 days    Plan of Care/Certification Expiration Date: 24      >PT Visit Info:   Plan Frequency: 2x/wk for 90 days  Plan of Care/Certification Expiration Date: 24  Total # of Visits Approved: 30  Total # of Visits to Date: 4  Progress Note Counter: 4      Visit Count:  2    OUTPATIENT PHYSICAL THERAPY:OP NOTE TYPE: OP Note Type: Treatment Note 10/17/2023       Episode  }Appt Desk             Treatment Diagnosis:    Visit Diagnoses           Codes     Pain in left ankle and joints of left foot    -  Primary M25.572     Difficulty in walking, not elsewhere classified     R26.2     Stiffness of left foot, not elsewhere classified     M25.675        Medical/Referring Diagnosis:  Left foot pain [M79.672]  Hammer toe of left foot [M20.42]  Acquired claw toe of left foot [M20.5X2]  Referring Physician:  Jo Becerra APRN - CNP  MD Orders:  PT Eval and Treat   Date of Onset:  L Foot Surgery 23 and 23  Allergies:   Fenofibrate micronized and Statins  Restrictions/Precautions:  No data recordedNo data recorded     Interventions Planned (Treatment may consist of any combination of the following):    Current Treatment Recommendations: Strengthening; ROM; Balance training; Gait training; Stair training; Manual; Pain management; Home exercise program; Patient/Caregiver education & training; Modalities       >Subjective Comments: To MD in the morning. Stiff in the mornings and when getting up from sitting. Doesn't feel like the pain/stiffness is getting better. >Initial:     5/10>Post Session:     5/10  Medications Last Reviewed:  10/17/2023  Updated Objective Findings:   see below    10/17/23:  On L foot, tip of 3rd toe above toenail, 0.5cm Thank you for coming to our Emergency Department today. It is important to remember that some problems or medical conditions are difficult to diagnose and may not be found or addressed during your Emergency Department visit.  These conditions often start with non-specific symptoms and can only be diagnosed on follow up visits with your primary care physician or specialist when the symptoms continue or change. Please remember that all medical conditions can change, and we cannot predict how you will be feeling tomorrow or the next day. Return to the ER with any questions/concerns, new/concerning symptoms, worsening or failure to improve.       Be sure to follow up with your primary care doctor and review all labs/imaging/tests that were performed during your ER visit with them. It is very common for us to identify non-emergent incidental findings which must be followed up with your primary care physician.  Some labs/imaging/tests may be outside of the normal range, and require non-emergent follow-up and/or further investigation/treatment/procedures/testing to help diagnose/exclude/prevent complications or other potentially serious medical conditions. Some abnormalities may not have been discussed or addressed during your ER visit. Some lab results may not return during your ER visit but can be accessible by downloading the free [facility] Mychart fazal or by visiting [email or web address] . It is important for you to review all labs/imaging/tests which are outside of the normal range with your physician.    An ER visit does not replace a primary care visit, and many screening tests or follow-up tests cannot be ordered by an ER doctor or performed by the ER. Some tests may even require pre-approval.    If you do not have a primary care doctor, you may contact the one listed on your discharge paperwork or you may also call the [facility] Appointment Desk at [phone] , or [address] [place] [address] at  [phone] to schedule  an appointment, or establish care with a primary care doctor or even a specialist and to obtain information about local resources. It is important to your health that you have a primary care doctor.    Please take all medications as directed. We have done our best to select a medication for you that will treat your condition however, all medications may potentially have side-effects and it is impossible to predict which medications may give you side-effects or what those side-effects (if any) those medications may give you.  If you feel that you are having a negative effect or side-effect of any medication you should stop taking those medications immediately and seek medical attention. If you feel that you are having a life-threatening reaction call 911.        Do not drive, swim, climb to height, take a bath, operate heavy machinery, drink alcohol or take potentially sedating medications, sign any legal documents or make any important decisions for 24 hours if you have received any pain medications, sedatives or mood altering drugs during your ER visit or within 24 hours of taking them if they have been prescribed to you.     You can find additional resources for Dentists, hearing aids, durable medical equipment, low cost pharmacies and other resources at [email or web address]

## (undated) DEVICE — BUTTON SWITCH PENCIL BLADE ELECTRODE, HOLSTER: Brand: EDGE

## (undated) DEVICE — PRECISION THIN (13.0 X 0.38 X 34.5MM)

## (undated) DEVICE — PRECISION THIN, OFFSET (5.5 X 0.38 X 25.0MM)

## (undated) DEVICE — AMD ANTIMICROBIAL GAUZE SPONGES,12 PLY USP TYPE VII, 0.2% POLYHEXAMETHYLENE BIGUANIDE HCI (PHMB): Brand: CURITY

## (undated) DEVICE — ZIMMER® STERILE DISPOSABLE TOURNIQUET CUFF WITH PLC, DUAL PORT, SINGLE BLADDER, 18 IN. (46 CM)

## (undated) DEVICE — FOOT & ANKLE SOFT DR WOMACK: Brand: MEDLINE INDUSTRIES, INC.

## (undated) DEVICE — SOLUTION IV 1000ML 0.9% SOD CHL

## (undated) DEVICE — PREP SKN CHLRAPRP APL 26ML STR --

## (undated) DEVICE — DRAPE C ARM W54XL84IN MINI FOR OEC 6800

## (undated) DEVICE — BANDAGE,GAUZE,CONFORMING,2"X75",STRL,LF: Brand: MEDLINE INDUSTRIES, INC.

## (undated) DEVICE — BNDG,ELSTC,MATRIX,STRL,4"X5YD,LF,HOOK&LP: Brand: MEDLINE

## (undated) DEVICE — BANDAGE COMPR W4INXL12FT E DISP ESMARCH EVEN

## (undated) DEVICE — BURR: Brand: MICA

## (undated) DEVICE — K-WIRE BLUNT/TROCAR
Type: IMPLANTABLE DEVICE | Site: FOOT | Status: NON-FUNCTIONAL
Removed: 2020-07-16

## (undated) DEVICE — REM POLYHESIVE ADULT PATIENT RETURN ELECTRODE: Brand: VALLEYLAB

## (undated) DEVICE — DRSG GZ OIL EMUL CURAD 3X8 --

## (undated) DEVICE — BNDG,ELSTC,MATRIX,STRL,3"X5YD,LF,HOOK&LP: Brand: MEDLINE

## (undated) DEVICE — BANDAGE GZ W2XL75IN ST RAYON POLY CNFRM STRTCH LTWT

## (undated) DEVICE — NON-THREADED KWIRE
Type: IMPLANTABLE DEVICE | Site: FOOT | Status: NON-FUNCTIONAL
Brand: MINI
Removed: 2021-06-17

## (undated) DEVICE — (D)PREP SKN CHLRAPRP APPL 26ML -- CONVERT TO ITEM 371833

## (undated) DEVICE — SPLINT CAST W4XL30IN WHT THMB FBRGLS PRECUT INTLOK WRINKLE

## (undated) DEVICE — SUTURE VCRL SZ 2-0 L27IN ABSRB UD L26MM CT-2 1/2 CIR J269H

## (undated) DEVICE — FOAM BUMP ROUND LARGE: Brand: MEDLINE INDUSTRIES, INC.

## (undated) DEVICE — PADDING CAST W2INXL4YD ST COT COHESIVE HND TEARABLE SPEC

## (undated) DEVICE — DRILL BIT: Brand: SALVATION

## (undated) DEVICE — PRECISION THIN (16.5 X 0.38 X 34.5MM)

## (undated) DEVICE — GDWIRE 1.1X150MM TROCAR TIP --

## (undated) DEVICE — PADDING CAST W4INXL4YD ST COT COHESIVE HND TEARABLE SPEC

## (undated) DEVICE — GLOVE SURG SZ 8 L12IN FNGR THK79MIL GRN LTX FREE

## (undated) DEVICE — BNDG ELAS COBAN 2INX5YD NS --

## (undated) DEVICE — Device

## (undated) DEVICE — HEX DRIVER: Brand: MICA

## (undated) DEVICE — SUT ETHLN 3-0 18IN PS1 BLK --

## (undated) DEVICE — BANDAGE COBAN 4 IN COMPR W4INXL5YD FOAM COHESIVE QUIK STK SELF ADH SFT

## (undated) DEVICE — KIT DRL BIT DIA2MM DRL GUID HNDL LOC PIN TAMP CORRESPONDING

## (undated) DEVICE — DRESSING PETRO W3XL8IN OIL EMUL N ADH GZ KNIT IMPREG CELOS

## (undated) DEVICE — BANDAGE COMPR L5YDXW2IN FOAM CO FLX

## (undated) DEVICE — DRAPE XR C ARM 41X74IN LF --

## (undated) DEVICE — GLOVE SURG SZ 65 CRM LTX FREE POLYISOPRENE POLYMER BEAD ANTI

## (undated) DEVICE — BANDAGE,ELASTIC,ESMARK,STERILE,4"X9',LF: Brand: MEDLINE

## (undated) DEVICE — SUTURE MCRYL SZ 3-0 L27IN ABSRB UD L19MM PS-2 3/8 CIR PRIM Y427H

## (undated) DEVICE — BANDAGE,GAUZE,CONFORMING,3"X75",STRL,LF: Brand: MEDLINE

## (undated) DEVICE — PRECISION THIN (9.0 X 0.38 X 31.0MM)

## (undated) DEVICE — CURITY NON-ADHERENT STRIPS: Brand: CURITY

## (undated) DEVICE — SUT ETHLN 3-0 18IN PS2 BLK --

## (undated) DEVICE — GOWN,SIRUS,NONRNF,SETINSLV,XL,20/CS: Brand: MEDLINE

## (undated) DEVICE — SOLUTION IRRIG 1000ML 0.9% SOD CHL USP POUR PLAS BTL

## (undated) DEVICE — K-WIRE
Type: IMPLANTABLE DEVICE | Site: FOOT | Status: NON-FUNCTIONAL
Removed: 2023-06-01

## (undated) DEVICE — INTENDED FOR TISSUE SEPARATION, AND OTHER PROCEDURES THAT REQUIRE A SHARP SURGICAL BLADE TO PUNCTURE OR CUT.: Brand: BARD-PARKER SAFETY BLADES SIZE 15, STERILE

## (undated) DEVICE — BNDG ELAS ESMARK 4INX12FT LF -- STRL

## (undated) DEVICE — BANDAGE COBAN 6 IN WND 6INX5YD FOAM

## (undated) DEVICE — CAP PROTCT PIN BALL 0.045IN --

## (undated) DEVICE — GLOVE SURG SZ 65 L12IN FNGR THK79MIL GRN LTX FREE

## (undated) DEVICE — FOOT DR TOLLISON & WOMACK: Brand: MEDLINE INDUSTRIES, INC.

## (undated) DEVICE — DRILL BIT: Brand: MICA

## (undated) DEVICE — ZIMMER® STERILE DISPOSABLE TOURNIQUET CUFF WITH PLC, DUAL PORT, SINGLE BLADDER, 30 IN. (76 CM)

## (undated) DEVICE — PAD,ABDOMINAL,5"X9",ST,LF,25/BX: Brand: MEDLINE INDUSTRIES, INC.

## (undated) DEVICE — PADDING CAST W2INXL4YD NONSTERILE COT COHESIVE HND TEARABLE

## (undated) DEVICE — JOINT PREP INSTRUMENT KIT: Brand: ORTHOLOC™ 2

## (undated) DEVICE — 2000CC GUARDIAN II: Brand: GUARDIAN

## (undated) DEVICE — SPLINT THMB W4XL30IN FBRGLS PD PRECUT LTWT DURABLE FAST SET

## (undated) DEVICE — DYNAMIC COMPRESSION SYSTEM: Brand: EASYFUSE

## (undated) DEVICE — AMD ANTIMICROBIAL BANDAGE ROLL,6 PLY: Brand: KERLIX

## (undated) DEVICE — GUIDE WIRE

## (undated) DEVICE — GOWN,PREVENTION PLUS,XLN/XL,ST,24/CS: Brand: MEDLINE